# Patient Record
Sex: FEMALE | Race: WHITE | NOT HISPANIC OR LATINO | Employment: UNEMPLOYED | URBAN - METROPOLITAN AREA
[De-identification: names, ages, dates, MRNs, and addresses within clinical notes are randomized per-mention and may not be internally consistent; named-entity substitution may affect disease eponyms.]

---

## 2017-09-18 PROBLEM — R56.9 GENERALIZED CONVULSIVE SEIZURES: Status: ACTIVE | Noted: 2017-09-18

## 2017-09-18 PROBLEM — G43.909 HEADACHE, MIGRAINE: Status: ACTIVE | Noted: 2017-09-18

## 2017-09-18 PROBLEM — Z72.0 TOBACCO ABUSE: Status: ACTIVE | Noted: 2017-09-18

## 2017-09-18 PROBLEM — G47.00 INSOMNIA: Status: ACTIVE | Noted: 2017-09-18

## 2017-11-28 ENCOUNTER — OFFICE VISIT (OUTPATIENT)
Dept: NEUROLOGY | Facility: CLINIC | Age: 52
End: 2017-11-28

## 2017-11-28 VITALS — HEIGHT: 67 IN | SYSTOLIC BLOOD PRESSURE: 104 MMHG | DIASTOLIC BLOOD PRESSURE: 73 MMHG | HEART RATE: 98 BPM

## 2017-11-28 DIAGNOSIS — G43.009 MIGRAINE WITHOUT AURA AND WITHOUT STATUS MIGRAINOSUS, NOT INTRACTABLE: Primary | ICD-10-CM

## 2017-11-28 DIAGNOSIS — R55 SYNCOPE AND COLLAPSE: ICD-10-CM

## 2017-11-28 DIAGNOSIS — R56.9 GENERALIZED CONVULSIVE SEIZURES (HCC): ICD-10-CM

## 2017-11-28 PROCEDURE — 99214 OFFICE O/P EST MOD 30 MIN: CPT | Performed by: PSYCHIATRY & NEUROLOGY

## 2017-11-28 RX ORDER — AMITRIPTYLINE HYDROCHLORIDE 10 MG/1
10 TABLET, FILM COATED ORAL NIGHTLY
Qty: 90 TABLET | Refills: 3 | Status: SHIPPED | OUTPATIENT
Start: 2017-11-28 | End: 2019-01-07 | Stop reason: SDUPTHER

## 2017-11-28 RX ORDER — MAGNESIUM CARB/ALUMINUM HYDROX 105-160MG
TABLET,CHEWABLE ORAL
COMMUNITY
Start: 2017-09-15 | End: 2019-01-07

## 2017-11-28 RX ORDER — POLYETHYLENE GLYCOL-3350 AND ELECTROLYTES 236; 6.74; 5.86; 2.97; 22.74 G/274.31G; G/274.31G; G/274.31G; G/274.31G; G/274.31G
POWDER, FOR SOLUTION ORAL
COMMUNITY
Start: 2017-09-08 | End: 2019-01-07

## 2017-11-28 RX ORDER — AMITRIPTYLINE HYDROCHLORIDE 10 MG/1
TABLET, FILM COATED ORAL
COMMUNITY
Start: 2017-11-14 | End: 2017-11-28 | Stop reason: SDUPTHER

## 2017-11-28 RX ORDER — LINACLOTIDE 290 UG/1
CAPSULE, GELATIN COATED ORAL
COMMUNITY
Start: 2017-11-21 | End: 2019-01-07

## 2017-11-28 RX ORDER — MIRABEGRON 50 MG/1
TABLET, FILM COATED, EXTENDED RELEASE ORAL
COMMUNITY
Start: 2017-11-19 | End: 2019-01-07

## 2017-11-28 NOTE — PROGRESS NOTES
Subjective:     Patient ID: Niesha Garcia is a 51 y.o. female.    CC:   Chief Complaint   Patient presents with   • Headache       HPI:   History of Present Illness  The following portions of the patient's history were reviewed and updated as appropriate: allergies, current medications, past family history, past medical history, past social history, past surgical history and problem list.     Patient reports that headaches are better, takes prn Exedrin. Reports a spell of dizziness causing a fall during a bowel prep for colonoscopy about 2 months ago. She has a history of spells, some with convulsions since age 10, told at [facility] that she does not have epilepsy. Spells are usually triggered by anxiety or fatigue. MRI brain and EEG were normal in 2015.    Past Medical History:   Diagnosis Date   • Migraine    • Numbness        History reviewed. No pertinent surgical history.    Social History     Social History   • Marital status:      Spouse name: N/A   • Number of children: N/A   • Years of education: N/A     Occupational History   • Not on file.     Social History Main Topics   • Smoking status: Smoker, Current Status Unknown   • Smokeless tobacco: Not on file   • Alcohol use No   • Drug use: Defer   • Sexual activity: Defer     Other Topics Concern   • Not on file     Social History Narrative       Family History   Problem Relation Age of Onset   • Alcohol abuse Other    • Cancer Other         Review of Systems   Constitutional: Negative for chills, fatigue, fever and unexpected weight change.   HENT: Negative for ear pain, hearing loss, nosebleeds, rhinorrhea and sore throat.    Eyes: Negative for photophobia, pain, discharge, itching and visual disturbance.   Respiratory: Negative for cough, chest tightness, shortness of breath and wheezing.    Cardiovascular: Negative for chest pain, palpitations and leg swelling.   Gastrointestinal: Negative for abdominal pain, blood in stool, constipation, diarrhea,  nausea and vomiting.   Genitourinary: Negative for dysuria, frequency, hematuria and urgency.   Musculoskeletal: Negative for arthralgias, back pain, gait problem, joint swelling, myalgias, neck pain and neck stiffness.   Skin: Negative for rash and wound.   Allergic/Immunologic: Negative for environmental allergies and food allergies.   Neurological: Negative for dizziness, tremors, seizures, syncope, speech difficulty, weakness, light-headedness, numbness and headaches.   Hematological: Negative for adenopathy. Does not bruise/bleed easily.   Psychiatric/Behavioral: Positive for dysphoric mood and sleep disturbance. Negative for agitation, confusion, decreased concentration, hallucinations and suicidal ideas. The patient is nervous/anxious.         Objective:    Neurologic Exam     Mental Status   Oriented to person, place, and time.       Physical Exam   Constitutional: She is oriented to person, place, and time. She appears well-developed and well-nourished.   Cardiovascular: Normal rate and regular rhythm.    Pulmonary/Chest: Effort normal.   Neurological: She is alert and oriented to person, place, and time. She has normal reflexes.   Psychiatric: She has a normal mood and affect. Her behavior is normal. Thought content normal.       Assessment/Plan:       Niesha was seen today for headache.    Diagnoses and all orders for this visit:    Migraine without aura and without status migrainosus, not intractable  -     amitriptyline (ELAVIL) 10 MG tablet; Take 1 tablet by mouth Every Night.    Syncope and collapse       -      Improve hydration, drink 6 glasses of water daily  Generalized convulsive seizures       -      We reviewed prior work up and history, no further tests needed.     Encouraged to call for new concerns.    Joey Goyal MD  11/28/2017

## 2018-12-22 DIAGNOSIS — G43.009 MIGRAINE WITHOUT AURA AND WITHOUT STATUS MIGRAINOSUS, NOT INTRACTABLE: ICD-10-CM

## 2018-12-27 RX ORDER — AMITRIPTYLINE HYDROCHLORIDE 10 MG/1
TABLET, FILM COATED ORAL
Qty: 30 TABLET | Refills: 2 | OUTPATIENT
Start: 2018-12-27

## 2019-01-07 ENCOUNTER — OFFICE VISIT (OUTPATIENT)
Dept: NEUROLOGY | Facility: CLINIC | Age: 54
End: 2019-01-07

## 2019-01-07 VITALS
DIASTOLIC BLOOD PRESSURE: 68 MMHG | BODY MASS INDEX: 17.11 KG/M2 | SYSTOLIC BLOOD PRESSURE: 112 MMHG | HEIGHT: 67 IN | WEIGHT: 109 LBS | HEART RATE: 73 BPM | OXYGEN SATURATION: 99 %

## 2019-01-07 DIAGNOSIS — H53.9 VISION CHANGES: Primary | ICD-10-CM

## 2019-01-07 DIAGNOSIS — G43.009 MIGRAINE WITHOUT AURA AND WITHOUT STATUS MIGRAINOSUS, NOT INTRACTABLE: ICD-10-CM

## 2019-01-07 PROCEDURE — 99214 OFFICE O/P EST MOD 30 MIN: CPT | Performed by: NURSE PRACTITIONER

## 2019-01-07 RX ORDER — AMITRIPTYLINE HYDROCHLORIDE 10 MG/1
10 TABLET, FILM COATED ORAL NIGHTLY
Qty: 90 TABLET | Refills: 3 | Status: SHIPPED | OUTPATIENT
Start: 2019-01-07 | End: 2019-12-19 | Stop reason: SDUPTHER

## 2019-01-07 NOTE — PROGRESS NOTES
"Subjective:     Patient ID: Niesha Garcia is a 53 y.o. female.    CC:   Chief Complaint   Patient presents with   • Migraine       HPI:   History of Present Illness   Ms Garcia is here for annual check on migraines and non epileptic seizures.  She tells me that she has been doing very well in regards to her migraine headaches on amitriptyline 10 mg, she ran out about a week ago and has had a headache since.  Typically while on her medicine regularly she has only one migraine every 2 months or so.  She also has a history of \"seizures\".,  She had apparent full workup at [facility] and was told her seizures were nonepileptic according to previous notes.  She tells me she still has episodes of her \"hands drawing\" which is what her seizures have always been like since the age of 10.  She does report these are much less intense and less often since adding the amitriptyline.  She has been struggling with some vision changes, last eye exam was a couple years ago when she told she had cataracts and other aging vision changes.  She has not had an eye exam since.  She reports her eyes are just more strained overall.  In her migraines are classic with sensitivity to light and sound and associated nausea.  Again these are much improved with amitriptyline.    The following portions of the patient's history were reviewed and updated as appropriate: allergies, current medications, past family history, past medical history, past social history, past surgical history and problem list.    Past Medical History:   Diagnosis Date   • Migraine    • Numbness        No past surgical history on file.    Social History     Socioeconomic History   • Marital status:      Spouse name: Not on file   • Number of children: Not on file   • Years of education: Not on file   • Highest education level: Not on file   Social Needs   • Financial resource strain: Not on file   • Food insecurity - worry: Not on file   • Food insecurity - " inability: Not on file   • Transportation needs - medical: Not on file   • Transportation needs - non-medical: Not on file   Occupational History   • Not on file   Tobacco Use   • Smoking status: Smoker, Current Status Unknown   Substance and Sexual Activity   • Alcohol use: No   • Drug use: Defer   • Sexual activity: Defer   Other Topics Concern   • Not on file   Social History Narrative   • Not on file       Family History   Problem Relation Age of Onset   • Alcohol abuse Other    • Cancer Other         Review of Systems   Constitutional: Negative.    Eyes: Positive for visual disturbance.   Gastrointestinal: Positive for nausea (w/migraines only).   Endocrine: Negative.    Genitourinary: Negative.    Musculoskeletal: Negative.    Skin: Negative.    Allergic/Immunologic: Negative.    Neurological: Positive for seizures and headaches. Negative for dizziness, tremors, syncope, facial asymmetry, speech difficulty, weakness, light-headedness and numbness.   Hematological: Negative.    Psychiatric/Behavioral: The patient is nervous/anxious.         Objective:    Neurologic Exam     Mental Status   Oriented to person, place, and time.   Attention: normal. Concentration: normal.   Speech: speech is normal   Level of consciousness: alert  Knowledge: consistent with education.   Able to read. Able to write. Normal comprehension.     Cranial Nerves   Cranial nerves II through XII intact.     CN II   Right visual field deficit: none  Left visual field deficit: none     CN III, IV, VI   Pupils are equal, round, and reactive to light.  Extraocular motions are normal.   Nystagmus: none   Upgaze: normal  Downgaze: normal    CN V   Facial sensation intact.     CN VII   Facial expression full, symmetric.     CN IX, X   CN IX normal.   CN X normal.     CN XI   CN XI normal.     CN XII   CN XII normal.     Motor Exam   Muscle bulk: normal  Overall muscle tone: normal    Strength   Strength 5/5 throughout.     Gait, Coordination, and  Reflexes     Gait  Gait: normal    Coordination   Romberg: negative  Finger to nose coordination: normal    Tremor   Resting tremor: absent  Intention tremor: absent  Action tremor: absent    Reflexes   Reflexes 2+ except as noted.   Right Alfaro: absent  Left Alfaro: absent      Physical Exam   Constitutional: She is oriented to person, place, and time. She appears well-developed and well-nourished. No distress.   HENT:   Head: Normocephalic and atraumatic.   Eyes: EOM are normal. Pupils are equal, round, and reactive to light.   Neck: Normal range of motion.   Cardiovascular: Normal rate.   Pulmonary/Chest: Effort normal. No respiratory distress.   Neurological: She is alert and oriented to person, place, and time. She has normal strength. No cranial nerve deficit. She has a normal Finger-Nose-Finger Test and a normal Romberg Test. Gait normal.   Skin: Skin is warm. Capillary refill takes less than 2 seconds.   Psychiatric: She has a normal mood and affect. Her speech is normal and behavior is normal. Judgment and thought content normal.   Vitals reviewed.      Assessment/Plan:       Niesha was seen today for migraine.    Diagnoses and all orders for this visit:    Vision changes  -     MRI Brain Without Contrast    Migraine without aura and without status migrainosus, not intractable  -     amitriptyline (ELAVIL) 10 MG tablet; Take 1 tablet by mouth Every Night.  -     MRI Brain Without Contrast    Mrs. Garcia reports her headaches are improved with the amitriptyline however she has noticed some vision changes in the past year.  She still having what she calls seizures which are described as her hands cramping, she has no mental status changes or other neurological complaints during these episodes.  She does have prior diagnosis of nonepileptic seizures, worse with stress.  She's tolerating the amitriptyline 10 mg with no problems, this is worked well for headache control.  She may increase to 20 mg if  needed as she reports that her seizures have greatly improved with the addition of this medicine.  Due to her vision changes I would like to get an MRI of her brain to rule out tumor or lesion  Follow-up in about 6-8 weeks or sooner if needed and, she will call with any problems or concerns prior to follow-up appointment  Reviewed medications, potential side effects and signs and symptoms to report. Discussed risk versus benefits of treatment plan with patient and/or family-including medications, labs and radiology that may be ordered. Addressed questions and concerns during visit. Patient and/or family verbalized understanding and agree with plan.  EMR Dragon/Transcription Disclaimer:  Much of this encounter note is an electronic transcription of spoken language to printed text. Electronic transcription of spoken language may permit erroneous words or phrases to be inadvertently transcribed. Although I have reviewed the note for such errors, some may still exist in this documentation.           Natali Leal, APRN  1/7/2019

## 2019-01-22 ENCOUNTER — HOSPITAL ENCOUNTER (OUTPATIENT)
Dept: MRI IMAGING | Facility: HOSPITAL | Age: 54
Discharge: HOME | End: 2019-01-22
Attending: NURSE PRACTITIONER | Admitting: NURSE PRACTITIONER

## 2019-01-22 PROCEDURE — 70551 MRI BRAIN STEM W/O DYE: CPT

## 2019-02-06 ENCOUNTER — TELEPHONE (OUTPATIENT)
Dept: NEUROLOGY | Facility: CLINIC | Age: 54
End: 2019-02-06

## 2019-02-06 NOTE — TELEPHONE ENCOUNTER
----- Message from CASSIDY Carroll sent at 2/6/2019  8:43 AM EST -----  Please let her know her MRI was read a unremarkable with exception of mild sinus inflammation. Keep follow up appt

## 2019-03-01 ENCOUNTER — OFFICE VISIT (OUTPATIENT)
Dept: NEUROLOGY | Facility: CLINIC | Age: 54
End: 2019-03-01

## 2019-03-01 VITALS
HEART RATE: 84 BPM | BODY MASS INDEX: 17.11 KG/M2 | WEIGHT: 109 LBS | DIASTOLIC BLOOD PRESSURE: 62 MMHG | OXYGEN SATURATION: 99 % | SYSTOLIC BLOOD PRESSURE: 98 MMHG | HEIGHT: 67 IN

## 2019-03-01 DIAGNOSIS — G43.009 MIGRAINE WITHOUT AURA AND WITHOUT STATUS MIGRAINOSUS, NOT INTRACTABLE: Primary | ICD-10-CM

## 2019-03-01 DIAGNOSIS — R56.9 GENERALIZED CONVULSIVE SEIZURES (HCC): ICD-10-CM

## 2019-03-01 PROCEDURE — 99213 OFFICE O/P EST LOW 20 MIN: CPT | Performed by: NURSE PRACTITIONER

## 2019-06-28 ENCOUNTER — OFFICE VISIT (OUTPATIENT)
Dept: NEUROLOGY | Facility: CLINIC | Age: 54
End: 2019-06-28

## 2019-06-28 VITALS
BODY MASS INDEX: 16.79 KG/M2 | OXYGEN SATURATION: 98 % | HEIGHT: 67 IN | HEART RATE: 102 BPM | WEIGHT: 107 LBS | SYSTOLIC BLOOD PRESSURE: 130 MMHG | DIASTOLIC BLOOD PRESSURE: 82 MMHG

## 2019-06-28 DIAGNOSIS — R56.9 SEIZURE-LIKE ACTIVITY (HCC): Primary | ICD-10-CM

## 2019-06-28 DIAGNOSIS — G43.009 MIGRAINE WITHOUT AURA AND WITHOUT STATUS MIGRAINOSUS, NOT INTRACTABLE: ICD-10-CM

## 2019-06-28 PROCEDURE — 99214 OFFICE O/P EST MOD 30 MIN: CPT | Performed by: NURSE PRACTITIONER

## 2019-08-09 ENCOUNTER — HOSPITAL ENCOUNTER (OUTPATIENT)
Dept: NEUROLOGY | Facility: HOSPITAL | Age: 54
Discharge: HOME | End: 2019-08-09
Admitting: NURSE PRACTITIONER

## 2019-08-09 PROCEDURE — 95816 EEG AWAKE AND DROWSY: CPT

## 2019-12-19 ENCOUNTER — OFFICE VISIT (OUTPATIENT)
Dept: NEUROLOGY | Facility: CLINIC | Age: 54
End: 2019-12-19

## 2019-12-19 VITALS
BODY MASS INDEX: 17.74 KG/M2 | OXYGEN SATURATION: 97 % | SYSTOLIC BLOOD PRESSURE: 100 MMHG | DIASTOLIC BLOOD PRESSURE: 70 MMHG | HEART RATE: 93 BPM | HEIGHT: 67 IN | WEIGHT: 113 LBS

## 2019-12-19 DIAGNOSIS — G43.009 MIGRAINE WITHOUT AURA AND WITHOUT STATUS MIGRAINOSUS, NOT INTRACTABLE: Primary | ICD-10-CM

## 2019-12-19 DIAGNOSIS — F41.9 ANXIETY: ICD-10-CM

## 2019-12-19 PROCEDURE — 99213 OFFICE O/P EST LOW 20 MIN: CPT | Performed by: NURSE PRACTITIONER

## 2019-12-19 RX ORDER — AMITRIPTYLINE HYDROCHLORIDE 10 MG/1
TABLET, FILM COATED ORAL
Qty: 90 TABLET | Refills: 3 | Status: SHIPPED | OUTPATIENT
Start: 2019-12-19 | End: 2020-06-17 | Stop reason: SDUPTHER

## 2019-12-19 NOTE — PROGRESS NOTES
"Subjective:     Patient ID: Niesha  Jose is a 54 y.o. female.    CC:   Chief Complaint   Patient presents with   • Migraine       HPI:   History of Present Illness     Ms Garcia is here for follow up   When I last saw her she was here for annual check on migraines and non epileptic seizures.  She has actually been doing very well with her migraines  on amitriptyline 10 mg.  She has only had 2 migraines in the past few months.    She also has hx of psychogenic seizures,   She had apparent full workup at [facility] and was told her seizures were nonepileptic according to previous notes.  She tells me she still has episodes of her \"hands drawing\" which is what her seizures have always been like since the age of 10.  She does report these are much less intense and less often since adding the amitriptyline.  She does tell me today that she has had a significant increase in her anxiety.  She has only been taking 10 mg of the amitriptyline, previously I did instruct her she may increase to 20.  She is going through a lot of stress right now.  She is not currently in psychiatric care.  RECENT TESTS INCLUDE  MRI brain read as normal    The following portions of the patient's history were reviewed and updated as appropriate: allergies, current medications, past family history, past medical history, past social history, past surgical history and problem list.    Past Medical History:   Diagnosis Date   • Migraine    • Numbness        History reviewed. No pertinent surgical history.    Social History     Socioeconomic History   • Marital status:      Spouse name: Not on file   • Number of children: Not on file   • Years of education: Not on file   • Highest education level: Not on file   Tobacco Use   • Smoking status: Current Every Day Smoker   Substance and Sexual Activity   • Alcohol use: No   • Drug use: Defer   • Sexual activity: Defer       Family History   Problem Relation Age of Onset   • " "Alcohol abuse Other    • Cancer Other         Review of Systems   Constitutional: Negative.    HENT: Negative.    Eyes: Negative.    Respiratory: Negative.    Cardiovascular: Negative.    Gastrointestinal: Negative.    Endocrine: Negative.    Genitourinary: Negative.    Musculoskeletal: Negative.    Skin: Negative.    Allergic/Immunologic: Negative.    Neurological: Negative.    Hematological: Negative.    Psychiatric/Behavioral: The patient is nervous/anxious.         Objective:  /70   Pulse 93   Ht 170.2 cm (67\")   Wt 51.3 kg (113 lb)   SpO2 97%   BMI 17.70 kg/m²     Neurologic Exam     Mental Status   Oriented to person, place, and time.   Attention: normal. Concentration: normal.   Speech: speech is normal   Level of consciousness: alert  Knowledge: consistent with education.   Able to read. Able to write. Normal comprehension.     Cranial Nerves   Cranial nerves II through XII intact.     CN II   Visual fields full to confrontation.   Right visual field deficit: none  Left visual field deficit: none     CN III, IV, VI   Pupils are equal, round, and reactive to light.  Extraocular motions are normal.   Right pupil: Shape: regular. Reactivity: brisk. Consensual response: intact. Accommodation: intact.   Left pupil: Shape: regular. Reactivity: brisk. Consensual response: intact. Accommodation: intact.   CN III: no CN III palsy  CN VI: no CN VI palsy  Nystagmus: none   Upgaze: normal  Downgaze: normal    CN V   Facial sensation intact.     CN VII   Facial expression full, symmetric.     CN VIII   CN VIII normal.     CN IX, X   CN IX normal.   CN X normal.     CN XI   CN XI normal.     CN XII   CN XII normal.     Motor Exam   Muscle bulk: normal  Overall muscle tone: normal  Right arm tone: normal  Left arm tone: normal  Right arm pronator drift: absent  Left arm pronator drift: absent  Right leg tone: normal  Left leg tone: normal    Strength   Strength 5/5 throughout.     Sensory Exam   Light touch " normal.     Gait, Coordination, and Reflexes     Gait  Gait: normal    Coordination   Finger to nose coordination: normal    Tremor   Resting tremor: absent  Intention tremor: absent  Action tremor: absent    Reflexes   Reflexes 2+ except as noted.   Right plantar: normal  Left plantar: normal  Right Alfaro: absent  Left Alfaro: absent      Physical Exam   Constitutional: She is oriented to person, place, and time. She appears well-developed and well-nourished.   HENT:   Head: Normocephalic and atraumatic.   Eyes: Pupils are equal, round, and reactive to light. Conjunctivae and EOM are normal. No scleral icterus.   Neck: Normal range of motion. Neck supple.   Pulmonary/Chest: Effort normal. No respiratory distress.   Neurological: She is alert and oriented to person, place, and time. She has normal strength. She has a normal Finger-Nose-Finger Test. Gait normal.   Skin: Skin is warm.   Psychiatric: She has a normal mood and affect. Her speech is normal and behavior is normal. Judgment and thought content normal.   Vitals reviewed.      Assessment/Plan:       Niesha was seen today for migraine.    Diagnoses and all orders for this visit:    Migraine without aura and without status migrainosus, not intractable  -     amitriptyline (ELAVIL) 10 MG tablet; Take 1-2 PO qhs    Anxiety  -     Thyroid Panel With TSH    Tonie is doing very well in regards to her migraine treatment.  She is only had 1 or 2 migraines in the past several months.  Unfortunately she is going through a lot of stress right now and her anxiety levels up.  For now we are going to go ahead and increase her amitriptyline to 20 mg at night, I do recommend that she establish care with a counselor.  I have put in an order for a TSH check due to her anxiety as well.  I will contact her with labs when they are resulted.  At this point I will see her back in about 6 months for her migraines, if she has any questions concerns or problems prior to her  follow-up appointment I encouraged her to notify my office.  Reviewed medications, potential side effects and signs and symptoms to report. Discussed risk versus benefits of treatment plan with patient and/or family-including medications, labs and radiology that may be ordered. Addressed questions and concerns during visit. Patient and/or family verbalized understanding and agree with plan.  We reviewed possible headache triggers, stress relief techniques, and alternative treatments for headaches. The patient was in understanding and all questions were addressed. We reviewed the diagnosis and treatment plan and medication side effect profile. The patient will follow up as scheduled.  EMR Dragon/Transcription Disclaimer:  Much of this encounter note is an electronic transcription of spoken language to printed text. Electronic transcription of spoken language may permit erroneous words or phrases to be inadvertently transcribed. Although I have reviewed the note for such errors, some may still exist in this documentation.           Natali Leal, APRN  12/19/2019

## 2020-01-14 DIAGNOSIS — G43.009 MIGRAINE WITHOUT AURA AND WITHOUT STATUS MIGRAINOSUS, NOT INTRACTABLE: ICD-10-CM

## 2020-01-15 RX ORDER — AMITRIPTYLINE HYDROCHLORIDE 10 MG/1
TABLET, FILM COATED ORAL
Qty: 30 TABLET | Refills: 2 | OUTPATIENT
Start: 2020-01-15

## 2020-06-17 ENCOUNTER — OFFICE VISIT (OUTPATIENT)
Dept: NEUROLOGY | Facility: CLINIC | Age: 55
End: 2020-06-17

## 2020-06-17 VITALS
HEART RATE: 79 BPM | SYSTOLIC BLOOD PRESSURE: 120 MMHG | DIASTOLIC BLOOD PRESSURE: 60 MMHG | BODY MASS INDEX: 17.89 KG/M2 | TEMPERATURE: 97.8 F | OXYGEN SATURATION: 98 % | WEIGHT: 114 LBS | HEIGHT: 67 IN

## 2020-06-17 DIAGNOSIS — G43.009 MIGRAINE WITHOUT AURA AND WITHOUT STATUS MIGRAINOSUS, NOT INTRACTABLE: ICD-10-CM

## 2020-06-17 PROCEDURE — 99212 OFFICE O/P EST SF 10 MIN: CPT | Performed by: NURSE PRACTITIONER

## 2020-06-17 RX ORDER — AMITRIPTYLINE HYDROCHLORIDE 10 MG/1
TABLET, FILM COATED ORAL
Qty: 90 TABLET | Refills: 3 | Status: SHIPPED | OUTPATIENT
Start: 2020-06-17 | End: 2021-09-29 | Stop reason: SDUPTHER

## 2020-06-17 NOTE — PROGRESS NOTES
Subjective:     Patient ID: Niesha  Jose is a 54 y.o. female.    CC:   Chief Complaint   Patient presents with   • Migraine       HPI:   History of Present Illness     Ms Jose is here for follow up   When I last saw her she was here for annual check on migraines and non epileptic seizures.  She has actually been doing very well with her migraines  on amitriptyline 10 mg, at last visit she was stating that she was a bit anxious so I advised her to increase her amitriptyline to 2 at night.  She says she took increased dose a couple nights and felt like it made her feel odd and she had a seizure-like episode.  She went back to 10 mg and she has been doing great since.  She reports at this time she is having rare mild headaches, when she does have a migraine they respond very well to 1-2 ibuprofen.   She has no new complaints today  The following portions of the patient's history were reviewed and updated as appropriate: allergies, current medications, past family history, past medical history, past social history, past surgical history and problem list.    Past Medical History:   Diagnosis Date   • Migraine    • Numbness        History reviewed. No pertinent surgical history.    Social History     Socioeconomic History   • Marital status:      Spouse name: Not on file   • Number of children: Not on file   • Years of education: Not on file   • Highest education level: Not on file   Tobacco Use   • Smoking status: Current Every Day Smoker   Substance and Sexual Activity   • Alcohol use: No   • Drug use: Defer   • Sexual activity: Defer       Family History   Problem Relation Age of Onset   • Alcohol abuse Other    • Cancer Other    • Migraines Mother         Review of Systems   Constitutional: Negative.    Eyes: Negative.    Respiratory: Negative.    Cardiovascular: Negative.    Gastrointestinal: Negative.    Endocrine: Negative.    Genitourinary: Negative.    Musculoskeletal: Negative.    Skin:  "Negative.    Allergic/Immunologic: Negative.    Neurological: Positive for numbness and headaches. Negative for dizziness, tremors, seizures, syncope, facial asymmetry, speech difficulty, weakness and light-headedness.   Hematological: Negative.    Psychiatric/Behavioral: Negative.         Objective:  /60   Pulse 79   Temp 97.8 °F (36.6 °C)   Ht 170.2 cm (67\")   Wt 51.7 kg (114 lb)   SpO2 98%   BMI 17.85 kg/m²     Neurologic Exam     Mental Status   Oriented to person, place, and time.   Attention: normal. Concentration: normal.   Speech: speech is normal   Level of consciousness: alert  Knowledge: consistent with education.   Able to read. Able to write. Normal comprehension.     Cranial Nerves   Cranial nerves II through XII intact.     CN II   Visual fields full to confrontation.   Right visual field deficit: none  Left visual field deficit: none     CN III, IV, VI   Pupils are equal, round, and reactive to light.  Extraocular motions are normal.   Right pupil: Shape: regular. Reactivity: brisk. Consensual response: intact. Accommodation: intact.   Left pupil: Shape: regular. Reactivity: brisk. Consensual response: intact. Accommodation: intact.   CN III: no CN III palsy  CN VI: no CN VI palsy  Nystagmus: none   Upgaze: normal  Downgaze: normal    CN V   Facial sensation intact.     CN VII   Facial expression full, symmetric.     CN VIII   CN VIII normal.     CN IX, X   CN IX normal.   CN X normal.     CN XI   CN XI normal.     CN XII   CN XII normal.     Motor Exam   Muscle bulk: normal  Overall muscle tone: normal  Right arm tone: normal  Left arm tone: normal  Right arm pronator drift: absent  Left arm pronator drift: absent  Right leg tone: normal  Left leg tone: normal    Strength   Strength 5/5 throughout.     Sensory Exam   Light touch normal.     Gait, Coordination, and Reflexes     Gait  Gait: normal    Coordination   Finger to nose coordination: normal    Tremor   Resting tremor: " absent  Intention tremor: absent  Action tremor: absent    Reflexes   Reflexes 2+ except as noted.   Right Alfaro: absent  Left Alfaro: absent      Physical Exam   Constitutional: She is oriented to person, place, and time. She appears well-developed and well-nourished. No distress.   HENT:   Head: Normocephalic and atraumatic.   Eyes: Pupils are equal, round, and reactive to light. Conjunctivae and EOM are normal. No scleral icterus.   Neck: Normal range of motion. Neck supple.   Pulmonary/Chest: Effort normal. No respiratory distress.   Neurological: She is alert and oriented to person, place, and time. She has normal strength. She has a normal Finger-Nose-Finger Test. Gait normal.   Skin: Skin is warm.   Psychiatric: She has a normal mood and affect. Her speech is normal and behavior is normal. Judgment and thought content normal.   Vitals reviewed.      Assessment/Plan:       Niesha was seen today for migraine.    Diagnoses and all orders for this visit:    Migraine without aura and without status migrainosus, not intractable  -     amitriptyline (ELAVIL) 10 MG tablet; Take 1-2 PO qhs    Tonie is doing well, she has no new complaints today.  She states her migraines are under very good control with 10 mg of amitriptyline.  She had a seizure-like episode after increasing amitriptyline about 6 months ago but none since when she went back to the 10 mg.  She is feeling well at this time, she would like to follow back here annually       She is instructed to notify my office with any questions concerns or problems prior to her follow-up appointment    Reviewed medications, potential side effects and signs and symptoms to report. Discussed risk versus benefits of treatment plan with patient and/or family-including medications, labs and radiology that may be ordered. Addressed questions and concerns during visit. Patient and/or family verbalized understanding and agree with plan.        Natali Leal,  APRN  6/17/2020

## 2021-09-29 DIAGNOSIS — G43.009 MIGRAINE WITHOUT AURA AND WITHOUT STATUS MIGRAINOSUS, NOT INTRACTABLE: ICD-10-CM

## 2021-10-01 RX ORDER — AMITRIPTYLINE HYDROCHLORIDE 10 MG/1
TABLET, FILM COATED ORAL
Qty: 90 TABLET | Refills: 2 | Status: SHIPPED | OUTPATIENT
Start: 2021-10-01 | End: 2022-01-26 | Stop reason: SDUPTHER

## 2022-01-26 ENCOUNTER — TELEPHONE (OUTPATIENT)
Dept: NEUROLOGY | Facility: CLINIC | Age: 57
End: 2022-01-26

## 2022-01-26 DIAGNOSIS — G43.009 MIGRAINE WITHOUT AURA AND WITHOUT STATUS MIGRAINOSUS, NOT INTRACTABLE: ICD-10-CM

## 2022-01-26 RX ORDER — AMITRIPTYLINE HYDROCHLORIDE 10 MG/1
TABLET, FILM COATED ORAL
Qty: 90 TABLET | Refills: 2 | Status: SHIPPED | OUTPATIENT
Start: 2022-01-26 | End: 2022-02-23 | Stop reason: SDUPTHER

## 2022-01-26 NOTE — TELEPHONE ENCOUNTER
----- Message from Roxana William sent at 1/26/2022  8:55 AM EST -----  PATIENT RESCHEDULED HER APPT DUE TO COVID SYMPTOMS, AND WILL NEED A REFILL ON HER amitriptyline.  I CONFIRMED HER PHARMACY, AND I RESCHEDULED HER APPT FOR FEBURARY 23.

## 2022-02-23 ENCOUNTER — OFFICE VISIT (OUTPATIENT)
Dept: NEUROLOGY | Facility: CLINIC | Age: 57
End: 2022-02-23

## 2022-02-23 VITALS
DIASTOLIC BLOOD PRESSURE: 80 MMHG | OXYGEN SATURATION: 94 % | HEIGHT: 66 IN | BODY MASS INDEX: 24.11 KG/M2 | SYSTOLIC BLOOD PRESSURE: 100 MMHG | WEIGHT: 150 LBS | TEMPERATURE: 96.7 F | HEART RATE: 91 BPM

## 2022-02-23 DIAGNOSIS — Z84.89 FAMILY HISTORY OF GENETIC DISORDER: ICD-10-CM

## 2022-02-23 DIAGNOSIS — G43.009 MIGRAINE WITHOUT AURA AND WITHOUT STATUS MIGRAINOSUS, NOT INTRACTABLE: Primary | ICD-10-CM

## 2022-02-23 PROCEDURE — 99213 OFFICE O/P EST LOW 20 MIN: CPT | Performed by: NURSE PRACTITIONER

## 2022-02-23 RX ORDER — AMITRIPTYLINE HYDROCHLORIDE 10 MG/1
TABLET, FILM COATED ORAL
Qty: 90 TABLET | Refills: 1 | Status: SHIPPED | OUTPATIENT
Start: 2022-02-23 | End: 2023-01-17 | Stop reason: SDUPTHER

## 2022-02-23 NOTE — PROGRESS NOTES
"Subjective:     Patient ID: Niesha  Jose is a 56 y.o. female.    CC:   Chief Complaint   Patient presents with   • Migraine       HPI:   History of Present Illness     Ms Garcia is here for follow up, she was last seen in June 2020.  She is a long term pt of Dr Goyal followed for migraines and non epileptic seizures  Couple years ago I started her on amitriptyline for her migraines and she tells me today that this is worked wonders for her.  She rarely has \"fits\", she may have 1 migraine a month, she can go a month or so without having any migraines.  She is only taking 10 mg and this dosage works very well for her.  She does not use anything for acute headache treatment and is not interested in a new prescription today  She denies any seizures or loss of consciousness.  She denies any new symptoms at all.  She has decreased a lot of the stress in her life and feels like this is helped her overall in many ways    She is asking about genetics testing, she has a strong family history on both sides of colon cancer, breast cancer and ovarian cancer.  2 of her daughters were also diagnosed with some type of genetic disorder, she is unsure exactly what but she would like testing performed    The following portions of the patient's history were reviewed and updated as appropriate: allergies, current medications, past family history, past medical history, past social history, past surgical history and problem list.    Past Medical History:   Diagnosis Date   • Migraine    • Numbness        No past surgical history on file.    Social History     Socioeconomic History   • Marital status:    Tobacco Use   • Smoking status: Current Every Day Smoker   • Smokeless tobacco: Never Used   Vaping Use   • Vaping Use: Never used   Substance and Sexual Activity   • Alcohol use: No   • Drug use: Defer   • Sexual activity: Defer       Family History   Problem Relation Age of Onset   • Alcohol abuse Other    • Cancer " "Other    • Migraines Mother         Review of Systems   Constitutional: Negative.    Eyes: Negative.    Respiratory: Negative.    Cardiovascular: Negative.    Gastrointestinal: Negative.    Neurological: Positive for headaches. Negative for dizziness, tremors, seizures, syncope, facial asymmetry, speech difficulty, weakness, light-headedness and numbness.        Objective:  /80   Pulse 91   Temp 96.7 °F (35.9 °C)   Ht 167.6 cm (66\")   Wt 68 kg (150 lb)   SpO2 94%   BMI 24.21 kg/m²     Neurologic Exam     Mental Status   Oriented to person, place, and time.   Attention: normal. Concentration: normal.   Speech: speech is normal   Level of consciousness: alert    Cranial Nerves   Cranial nerves II through XII intact.     CN III, IV, VI   Pupils are equal, round, and reactive to light.    Motor Exam   Muscle bulk: normal    Strength   Strength 5/5 throughout.     Gait, Coordination, and Reflexes     Gait  Gait: normal    Coordination   Finger to nose coordination: normal    Tremor   Resting tremor: absent  Intention tremor: absent  Action tremor: absent    Reflexes   Reflexes 2+ except as noted.       Physical Exam  Vitals reviewed.   Constitutional:       General: She is not in acute distress.     Appearance: She is well-developed. She is not ill-appearing or toxic-appearing.   HENT:      Head: Normocephalic and atraumatic.   Eyes:      General: No scleral icterus.     Extraocular Movements: Extraocular movements intact.      Conjunctiva/sclera: Conjunctivae normal.      Pupils: Pupils are equal, round, and reactive to light.   Pulmonary:      Effort: Pulmonary effort is normal. No respiratory distress.   Musculoskeletal:      Cervical back: Normal range of motion and neck supple.   Skin:     General: Skin is warm.      Capillary Refill: Capillary refill takes less than 2 seconds.   Neurological:      General: No focal deficit present.      Mental Status: She is alert and oriented to person, place, and " "time.      Coordination: Finger-Nose-Finger Test normal.      Gait: Gait is intact.      Deep Tendon Reflexes: Strength normal.   Psychiatric:         Mood and Affect: Mood normal.         Speech: Speech normal.         Behavior: Behavior normal.         Thought Content: Thought content normal.         Judgment: Judgment normal.         Assessment/Plan:       Diagnoses and all orders for this visit:    1. Migraine without aura and without status migrainosus, not intractable (Primary)  -     amitriptyline (ELAVIL) 10 MG tablet; Take 1 PO qhs  Dispense: 90 tablet; Refill: 1    2. Family history of genetic disorder  -     Ambulatory Referral to [facility]    Ms. Garcia is doing very well on low-dose amitriptyline, she has occasional migraines but they are much improved, she is not really having her \"fits\" anymore  She has decreased stress in her life and feels better overall.  She will continue amitriptyline, declines prescription for acute headache medication  Encourage hydration, adequate sleep, limiting NSAID and caffeine  She will continue current dose of amitriptyline  Referral to genetics per her request  Return to clinic 6 to 8 months or sooner if needed  She is encouraged to reach out with any questions concerns or problems prior to follow-up appointment         Reviewed medications, potential side effects and signs and symptoms to report. Discussed risk versus benefits of treatment plan with patient and/or family-including medications, labs and radiology that may be ordered. Addressed questions and concerns during visit. Patient and/or family verbalized understanding and agree with plan.    AS THE PROVIDER, I PERSONALLY WORE PPE DURING ENTIRE FACE TO FACE ENCOUNTER IN CLINIC WITH THE PATIENT. PATIENT ALSO WORE PPE DURING ENTIRE FACE TO FACE ENCOUNTER EXCEPT FOR A MAX OF 30 SECONDS DURING NEUROLOGICAL EVALUATION OF CRANIAL NERVES AND THEN MASK WAS PLACED BACK OVER PATIENT FACE FOR REMAINDER OF VISIT. " I WASHED MY HANDS BEFORE AND AFTER VISIT.    During this visit the following were done:  Labs Reviewed []    Labs Ordered []    Radiology Reports Reviewed []    Radiology Ordered []    PCP Records Reviewed []    Referring Provider Records Reviewed []    ER Records Reviewed []    Hospital Records Reviewed []    History Obtained From Family []    Radiology Images Reviewed []    Other Reviewed []    Records Requested []      Natali Leal, APRN  2/23/2022

## 2022-12-02 ENCOUNTER — TELEPHONE (OUTPATIENT)
Dept: GENETICS | Facility: HOSPITAL | Age: 57
End: 2022-12-02

## 2022-12-02 NOTE — TELEPHONE ENCOUNTER
Called patient to remind them of genetic counseling appt on Tuesday. Left message asking patient to call me back to review family history prior to appt.

## 2022-12-06 ENCOUNTER — LAB (OUTPATIENT)
Dept: LAB | Facility: HOSPITAL | Age: 57
End: 2022-12-06

## 2022-12-06 ENCOUNTER — CLINICAL SUPPORT (OUTPATIENT)
Dept: GENETICS | Facility: HOSPITAL | Age: 57
End: 2022-12-06

## 2022-12-06 DIAGNOSIS — Z80.3 FAMILY HISTORY OF MALIGNANT NEOPLASM OF BREAST: ICD-10-CM

## 2022-12-06 DIAGNOSIS — Z80.41 FAMILY HISTORY OF MALIGNANT NEOPLASM OF OVARY: ICD-10-CM

## 2022-12-06 DIAGNOSIS — Z13.79 GENETIC TESTING: Primary | ICD-10-CM

## 2022-12-06 DIAGNOSIS — Z80.0 FAMILY HISTORY OF MALIGNANT NEOPLASM OF GASTROINTESTINAL TRACT: ICD-10-CM

## 2022-12-06 PROCEDURE — 96040: CPT | Performed by: GENETIC COUNSELOR, MS

## 2022-12-06 NOTE — PROGRESS NOTES
Niesha Garcia, a 57-year old female, was referred for genetic counseling due to a family history of cancer. Ms. Garcia reports that her daughter recently tested positive for one mutation in the MUTYH gene.  Ms. Garcia was 17 years of age at menarche and her first live birth was at 23. She retains her uterus and ovaries. Her most recent mammogram was in 2018. She had her first colonoscopy in 2020 and reports that 3-4 polyps were removed. She was interested in discussing her risks for a hereditary cancer syndrome. Ms. Garcia was interested in pursuing genetic testing, and therefore, the [facility] panel through [employer] was ordered which analyzes MUTYH and 35 additional genes associated with an increased cancer risk. Results from this testing are expected in approximately 2-3 weeks.    FAMILY HISTORY: (See attached pedigree)   Mother:  Ovarian cancer, 30s; several colon polyps (at least 10-20 polyps over lifetime)  Mat. Uncle:  Colon cancer, d. 43  Father:   Mouth cancer, d. 67  Pat. Aunt:  Breast cancer, 40s  Pat. Aunt:  Breast cancer, 40s  Daughter:  MUTYH carrier (one mutation in MUTYH gene- copy of report not provided)    Medical records regarding the family history were not available for review.     RISK ASSESSMENT:  Ms. Garcia’s family history led to concern regarding a hereditary cancer syndrome.  She clearly meets NCCN guidelines criteria for BRCA1/2 testing given her maternal family history of ovarian cancer and paternal family history of breast cancer diagnosed before age 50. Additionally, Ms. Garcia’s daughter is reported to have an MUTYH mutation, therefore testing of this gene is clinically indicated for Ms. Garcia. Ms. Garcia opted to pursue testing via a multigene panel.     GENETIC COUNSELING: (30 minutes) We reviewed the family history information in detail. Cases of cancer follow three general patterns: sporadic, familial, and hereditary.  While most cancer is sporadic, some cases  appear to occur in family clusters. These cases are said to be familial and account for 10-20% of cancer cases.  Familial cases may be due to a combination of shared genes and environmental factors among family members. In even fewer families (5-10%), the risk for cancer is inherited, and the genes responsible for the increased cancer risk are known.     Family histories typical of hereditary cancer syndromes usually include multiple first- and second-degree relatives diagnosed with cancer types that define a syndrome. These cases tend to be diagnosed at younger-than-expected ages and can be bilateral or multifocal.  The cancer in these families follows an autosomal dominant inheritance pattern, which indicates the likely presence of a mutation in a cancer susceptibility gene.  Children and siblings of an individual believed to carry this mutation have a 50% chance of inheriting that mutation, thereby inheriting the increased risk to develop cancer.  These mutations can be passed down from the maternal or the paternal lineage.    Ms. Garcia reports that her daughter tested positive for a single heterozygous mutation in the MUTYH gene. The presence of only one MUTYH mutation indicates that an individual does NOT have MUTYH-Associated Polyposis (MAP), but is a carrier.  Unlike most hereditary cancer syndromes, MAP is inherited in an autosomal recessive manner; therefore, if someone is identified as having one MUTYH mutation, it does not place them at the significantly increased cancer risks that are seen when someone has two mutations, one in each copy of the gene.  Current NCCN guidelines (2022) report a 10-13% lifetime risk of colon cancer in individuals with one MUTYH mutation who have a first-degree relative with colon cancer and a 6-7% lifetime risk of colon cancer in individuals with one MUTYH mutation irrespective of family history. Because the general population MUTYH carrier frequency is 1-2%, it is possible  for individuals to have inherited an additional mutation. Therefore, rather than just looking for the identified mutation, full MUTYH analysis is indicated. For individuals found to have two MUTYH mutations, screening colonoscopy begins at age 25.      Given the extent of the family history, we discussed the availability of multigene panels that allow for testing of a number of cancer susceptibility genes simultaneously.  The additional genes included have varying degrees of risk associated, and some have also been associated with increased risks for other cancers. Ms. Garcia meets NCCN guidelines criteria for BRCA1/2 testing based on her family history of breast and ovarian cancer. A significant proportion of hereditary breast and ovarian cancer can be attributed to mutations in the BRCA1 and BRCA2 genes.  Mutations in these genes confer an increased risk for breast cancer, ovarian cancer, male breast cancer, prostate cancer, and pancreatic cancer. Women with a BRCA1 or BRCA2 mutation have up to an 87% lifetime risk of breast cancer and up to a 44% risk of ovarian cancer.  Given the family history and the possibility of additional genetic risk factors present in this family, Ms. Garcia opted to pursue multigene panel testing.     Ms. Garcia also reported that her daughter tested positive for a variant in the MTHFR gene (copy of report not provided for review). We reviewed the current information on MTHFR deficiency.  It follows an autosomal recessive inheritance pattern, meaning that an individual must inherit two variants, one in each copy of MTHFR, in order to be at risk of having related symptoms. Approximately 45% of the  population is heterozygous or a carrier of an MTHFR variant while 10-20% of the general population is homozygous or compound heterozygous for two variants in the MTHFR gene.  These are very common variants in the general population, and the vast majority of individuals who carry  these are unaffected by the variants.  In some individuals who have two mutations, this may be associated with elevated plasma homocysteine level, which may be a risk factor for coronary artery disease and venous thrombosis. According to the literature and a consensus statement from American College of Medical Genetics (ACMG), in the absence of elevated homocysteine levels, MTHFR variants appear to have no clinical relevance.  We discussed that the ACMG, ACOG, the American Heart Association, and other professional groups recommend against testing of MTHFR since the information does not impact medical management.    GENETIC TESTING:  The risks, benefits and limitations of genetic testing and implications for clinical management following testing were reviewed. DNA test results can influence decisions regarding screening and prevention.  Genetic testing can have significant psychological implications for both individuals and families. Also discussed was the possibility of employment and insurance discrimination based on genetic test results and the laws in place to prevent this ([place]), as well as the limitations of these laws.     Due to the family history, we discussed comprehensive multigene panel testing, which would allow for analysis of MUTYH as well as 35 additional genes associated with hereditary cancer risk. The benefits and limitations of genetic testing were discussed and Ms. Garcia decided to pursue genetic testing. The implications of a positive or negative test result were discussed. We discussed the possibility that, in some cases, genetic test results may be ambiguous due to the identification of a genetic variant of uncertain significance (VUS). These variants may or may not be associated with an increased cancer risk. If a VUS is identified, testing family members is typically not recommended and screening recommendations are made based on the family history. The laboratories that perform genetic  testing work to reclassify the VUS and send out an amended report if and when a VUS is reclassified. The majority of variant findings are ultimately reclassified to a negative result.     PLAN: Genetic testing was ordered via the [employer] panel through [employer]. Results are expected in 2-3 weeks. Ms. Garcia is welcome to contact us in the meantime at [phone] with any questions she may have.      Zara Tyler MS, [facility] [place] [facility]  Licensed Certified Genetic Counselor

## 2023-01-12 ENCOUNTER — DOCUMENTATION (OUTPATIENT)
Dept: GENETICS | Facility: HOSPITAL | Age: 58
End: 2023-01-12
Payer: MEDICAID

## 2023-01-12 NOTE — PROGRESS NOTES
Niesha Garcia, a 57-year old female, was referred for genetic counseling due to a family history of cancer. Ms. Garcia reports that her daughter recently tested positive for one mutation in the MUTYH gene.  Ms. Garcia was 17 years of age at menarche and her first live birth was at 23. She retains her uterus and ovaries. Her most recent mammogram was in 2018. She had her first colonoscopy in 2020 and reports that 3-4 polyps were removed. She was interested in discussing her risks for a hereditary cancer syndrome. Ms. Garcia was interested in pursuing genetic testing, and therefore, the CancerNext panel through [facility] was ordered which analyzes MUTYH and 35 additional genes associated with an increased cancer risk. The genes on this panel include APC, IRISH, AXIN2, BARD1, BMPR1A, BRCA1, BRCA2, BRIP1, CDH1, CDK4, CDKN2A, CHEK2, DICER1, EPCAM, GREM1, HOXB13, MLH1, MRE11A, MSH2, MSH3, MSH6, MUTYH, NBN, NF1, NTHL1, PALB2, PMS2, POLD1, POLE, PTEN, RAD51C, RAD51D, RECQL, SMAD4, SMARCA4, STK11, and TP53.  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 These results were discussed with Ms. Garcia by telephone on 1/12/2023.    FAMILY HISTORY: (See attached pedigree)   Mother:  Ovarian cancer, 30s; several colon polyps (at least 10-20 polyps over lifetime)  Mat. Uncle:  Colon cancer, d. 43  Father:   Mouth cancer, d. 67  Pat. Aunt:  Breast cancer, 40s  Pat. Aunt:  Breast cancer, 40s  Daughter:  MUTYH carrier (one mutation in MUTYH gene- copy of report not provided)    Medical records regarding the family history were not available for review.     RISK ASSESSMENT:  Ms. Garcia’s family history led to concern regarding a hereditary cancer syndrome.  She clearly meets NCCN guidelines criteria for BRCA1/2 testing given her maternal family history of ovarian cancer and paternal family history of breast cancer  diagnosed before age 50. Additionally, Ms. Garcia’s daughter is reported to have an MUTYH mutation, therefore testing of this gene is clinically indicated for Ms. Garcia. Ms. Garcia opted to pursue testing via a multigene panel.     GENETIC COUNSELING: We reviewed the family history information in detail. Cases of cancer follow three general patterns: sporadic, familial, and hereditary.  While most cancer is sporadic, some cases appear to occur in family clusters. These cases are said to be familial and account for 10-20% of cancer cases.  Familial cases may be due to a combination of shared genes and environmental factors among family members. In even fewer families (5-10%), the risk for cancer is inherited, and the genes responsible for the increased cancer risk are known.     Family histories typical of hereditary cancer syndromes usually include multiple first- and second-degree relatives diagnosed with cancer types that define a syndrome. These cases tend to be diagnosed at younger-than-expected ages and can be bilateral or multifocal.  The cancer in these families follows an autosomal dominant inheritance pattern, which indicates the likely presence of a mutation in a cancer susceptibility gene.  Children and siblings of an individual believed to carry this mutation have a 50% chance of inheriting that mutation, thereby inheriting the increased risk to develop cancer.  These mutations can be passed down from the maternal or the paternal lineage.    Ms. Garcia reports that her daughter tested positive for a single heterozygous mutation in the MUTYH gene. The presence of only one MUTYH mutation indicates that an individual does NOT have MUTYH-Associated Polyposis (MAP), but is a carrier.  Unlike most hereditary cancer syndromes, MAP is inherited in an autosomal recessive manner; therefore, if someone is identified as having one MUTYH mutation, it does not place them at the significantly increased cancer  risks that are seen when someone has two mutations, one in each copy of the gene.  Current NCCN guidelines (2022) report a 10-13% lifetime risk of colon cancer in individuals with one MUTYH mutation who have a first-degree relative with colon cancer and a 6-7% lifetime risk of colon cancer in individuals with one MUTYH mutation irrespective of family history. Because the general population MUTYH carrier frequency is 1-2%, it is possible for individuals to have inherited an additional mutation. Therefore, rather than just looking for the identified mutation, full MUTYH analysis is indicated. For individuals found to have two MUTYH mutations, screening colonoscopy begins at age 25.      Given the extent of the family history, we discussed the availability of multigene panels that allow for testing of a number of cancer susceptibility genes simultaneously.  The additional genes included have varying degrees of risk associated, and some have also been associated with increased risks for other cancers. Ms. Garcia meets NCCN guidelines criteria for BRCA1/2 testing based on her family history of breast and ovarian cancer. A significant proportion of hereditary breast and ovarian cancer can be attributed to mutations in the BRCA1 and BRCA2 genes.  Mutations in these genes confer an increased risk for breast cancer, ovarian cancer, male breast cancer, prostate cancer, and pancreatic cancer. Women with a BRCA1 or BRCA2 mutation have up to an 87% lifetime risk of breast cancer and up to a 44% risk of ovarian cancer.  Given the family history and the possibility of additional genetic risk factors present in this family, Ms. Garcia opted to pursue multigene panel testing.     Ms. Garcia also reported that her daughter tested positive for a variant in the MTHFR gene (copy of report not provided for review). We reviewed the current information on MTHFR deficiency.  It follows an autosomal recessive inheritance pattern, meaning  that an individual must inherit two variants, one in each copy of MTHFR, in order to be at risk of having related symptoms. Approximately 45% of the  population is heterozygous or a carrier of an MTHFR variant while 10-20% of the general population is homozygous or compound heterozygous for two variants in the MTHFR gene.  These are very common variants in the general population, and the vast majority of individuals who carry these are unaffected by the variants.  In some individuals who have two mutations, this may be associated with elevated plasma homocysteine level, which may be a risk factor for coronary artery disease and venous thrombosis. According to the literature and a consensus statement from American College of Medical Genetics (ACMG), in the absence of elevated homocysteine levels, MTHFR variants appear to have no clinical relevance.  We discussed that the ACMG, ACOG, the American Heart Association, and other professional groups recommend against testing of MTHFR since the information does not impact medical management.    GENETIC TESTING:  The risks, benefits and limitations of genetic testing and implications for clinical management following testing were reviewed. DNA test results can influence decisions regarding screening and prevention.  Genetic testing can have significant psychological implications for both individuals and families. Also discussed was the possibility of employment and insurance discrimination based on genetic test results and the laws in place to prevent this (RANDI), as well as the limitations of these laws.     Due to the family history, we discussed comprehensive multigene panel testing, which would allow for analysis of MUTYH as well as 35 additional genes associated with hereditary cancer risk. The benefits and limitations of genetic testing were discussed and Ms. Garcia decided to pursue genetic testing. The implications of a positive or negative test result were  discussed. We discussed the possibility that, in some cases, genetic test results may be ambiguous due to the identification of a genetic variant of uncertain significance (VUS). These variants may or may not be associated with an increased cancer risk. If a VUS is identified, testing family members is typically not recommended and screening recommendations are made based on the family history. The laboratories that perform genetic testing work to reclassify the VUS and send out an amended report if and when a VUS is reclassified. The majority of variant findings are ultimately reclassified to a negative result.     TEST RESULTS:  Genetic testing was positive for one likely pathogenic variant ([id number].R241Q) in the MUTYH gene (see attached results).  The presence of one MUTYH mutation indicates that Ms. Garcia does NOT have MUTYH-Associated Polyposis (MAP), but is a carrier.  Unlike most hereditary cancer syndromes, MAP is inherited in an autosomal recessive manner; therefore, Ms. Garcia being identified as having one MUTYH mutation does not place her at the significantly increased cancer risks that are seen when someone has two mutations, one in each copy of the gene. Current NCCN guidelines (2022) report a 10-13% lifetime risk of colon cancer in individuals with one MUTYH mutation who have a first-degree relative with colon cancer and a 6-7% lifetime risk of colon cancer in individuals with one MUTYH mutation irrespective of family history.     Based on Ms. Garcia’s test results, her siblings and children each have a 50% chance to have inherited the MUTYH mutation identified. The general population carrier frequency is 1-2%; therefore, it is possible for Ms. Garcia’s family members to have inherited an additional mutation from the other parent. Therefore, rather than just performing single site analysis for the identified mutation, full MUTYH analysis is indicated for family members. Genetic counseling and testing  could be considered for Ms. Garcia’s family members.  For individuals found to have two MUTYH mutations, screening colonoscopy begins at age 25.  Genetic testing for adult onset conditions is not recommended before age 18. We would be happy to see family members who live in the area in our clinic to further discuss this information and testing options. Relatives can call our office at [phone] for assistance in scheduling an appointment; however, a physician referral to our office will prompt our coordinator to contact patients to schedule an appointment.  For family members who live elsewhere, there are genetic counselors at most [facility]. They can find a genetic counselor by visiting the National Society of Genetic Counselors website at www.nsgc.org.      SURVEILLANCE:  Per current NCCN guidelines, individuals who are carriers of one MUTYH mutation and have a family history of colon cancer in a first-degree relative should begin screening colonoscopy at age 40 or ten years prior to the youngest colorectal cancer diagnosis in the family and repeat every five years.  Ms. Garcia does not have a known history of colon cancer in a first-degree relative; however, she does report a significant history of polyps in her mother, therefore her frequency of colonoscopy screening should be based on her personal and family history of colon polyps.     At this time, Ms. Garcia’s lifetime risk of developing breast cancer should be assessed using family history-based risk assessment models that take into account personal history factors (age at menarche, age at first live birth, etc.) and family history information. Using these computer models, Ms. Garcia’s lifetime breast cancer risk is estimated to be up to 9.3% (SUNNY/Gay), compared to the average 55-year-old female’s lifetime risk of 8.4%. A risk greater than 20% warrants consideration of increased screening per NCCN guidelines; Ms. Garcia does  not fall into this category. Per NCCN guidelines, it is appropriate for her to continue to follow general population screening guidelines for her breast cancer risk including annual clinical breast exam and annual mammogram. This assessment is based on the information provided at the time of the appointment and could change should new information be obtained.    PLAN:  Genetic counseling remains available to Ms. Garcia and her family. She is welcome to contact our office with any questions or concerns at [phone].       Zara Tyler MS, [facility] [place], Located within [facility]  Licensed Certified Genetic Counselor      Cc: CASSIDY Magaña

## 2023-01-17 ENCOUNTER — OFFICE VISIT (OUTPATIENT)
Dept: NEUROLOGY | Facility: CLINIC | Age: 58
End: 2023-01-17
Payer: MEDICAID

## 2023-01-17 VITALS
SYSTOLIC BLOOD PRESSURE: 98 MMHG | DIASTOLIC BLOOD PRESSURE: 64 MMHG | HEIGHT: 66 IN | WEIGHT: 110 LBS | BODY MASS INDEX: 17.68 KG/M2 | OXYGEN SATURATION: 96 % | HEART RATE: 94 BPM

## 2023-01-17 DIAGNOSIS — G43.109 MIGRAINE WITH AURA AND WITHOUT STATUS MIGRAINOSUS, NOT INTRACTABLE: Primary | ICD-10-CM

## 2023-01-17 PROBLEM — G25.0 TREMOR, ESSENTIAL: Status: ACTIVE | Noted: 2023-01-17

## 2023-01-17 PROCEDURE — 99213 OFFICE O/P EST LOW 20 MIN: CPT | Performed by: NURSE PRACTITIONER

## 2023-01-17 RX ORDER — ERGOCALCIFEROL (VITAMIN D2) 10 MCG
400 TABLET ORAL DAILY
COMMUNITY

## 2023-01-17 RX ORDER — AMITRIPTYLINE HYDROCHLORIDE 10 MG/1
TABLET, FILM COATED ORAL
Qty: 90 TABLET | Refills: 3 | Status: SHIPPED | OUTPATIENT
Start: 2023-01-17

## 2023-01-17 RX ORDER — DIPHENOXYLATE HYDROCHLORIDE AND ATROPINE SULFATE 2.5; .025 MG/1; MG/1
TABLET ORAL DAILY
COMMUNITY

## 2023-01-17 RX ORDER — RIZATRIPTAN BENZOATE 5 MG/1
TABLET ORAL
Qty: 9 TABLET | Refills: 11 | Status: SHIPPED | OUTPATIENT
Start: 2023-01-17

## 2023-01-17 NOTE — LETTER
"January 17, 2023     CASSIDY Rothman  [address]  [address]  [facility] [address]    Patient: Niesha Garcia   YOB: 1965   Date of Visit: 1/17/2023       Dear CASSIDY Rothman    Niesha Garcia was in my office today. Below is a copy of my note.    If you have questions, please do not hesitate to call me. I look forward to following Niesha along with you.         Sincerely,        CASSIDY Maier        CC: No Recipients    Subjective:     Patient ID: Niesha Garcia is a 57 y.o. female.    CC:   Chief Complaint   Patient presents with   • Migraine       HPI:   History of Present Illness   Today 1/17/2023-  This is a 57-year-old female who presents for 1 year neurology follow-up. She was previously seen in clinic on 02/23/2022 by CASSIDY Doe, and then prior to that followed long term in clinic by Dr. Joey Goyal, neurology. She has been following for migraine headaches and nonepileptic seizures for several years. She has been on low dose amitriptyline for migraine prevention. She did undergo an MRI of the brain without contrast on 01/22/2019, which was read as normal by radiology. She did undergo an EEG on 08/09/2019, which was normal. Routine was read as normal and previous EEG on 07/16/2015 was also read as normal. She is here for follow-up and reevaluation of migraines. At last visit in clinic, she requested a referral for genetics since she has a strong family history of colon cancer, breast cancer, and ovarian cancer, so she was referred for that evaluation as well.    She reports that her headaches have been \" not too bad. \" She states that she had a headache yesterday, 01/16/2023, and that is why she did not wash her hair because it is super sensitive. She states that she tries to \"pass through it and not to agitate it\". She reports that she has a little nausea with the headaches. She mentions that she has really good hearing " and is able to hear a lot of stuff, but it does not bother her when a headache occurs. She does have sensitivity to light when experiencing the headaches. She states that she will experience tunnel vision with the headaches where her vision goes from wide to narrow. She confirms that she has a bowel issue with it where she would get diarrhea with really bad pains and that it usually leads to her ending up on the floor because her blood sugar drops. She reports that she has been treating her blood sugar all these years, but it did not help with her headaches. She states that it is more like an abdominal type migraine where the vagal response being affected. She would experience those types of spells between 4 and 6 times in a year since she has been on the amitriptyline, which is a big improvement. The type of migraines she would have used to occur 4 to 6 times per week before starting the medication. She notes that she can get the onsets of a headache where she gets numbness and cramping in her hands and in her jaw. She states that it will usually not go to that point unless her stomach starts to present symptoms. She notes that she used to take a sugar pill due to her sugar becoming low and they thought it was the sugar levels causing the problem until further examination was performed. She has never taken any triptans prior, despite being prescribed it previously by Dr. Goyal for as needed sumatriptan per prior neuro notes. She states that she has been struggling with this issue since she was 10 years old.     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 She states that she is doing much better than she was in the past. She adds that she has seen [facility] in the past as well. She states that they always misdiagnosed it as a sugar level problem.  She states that she is getting to the point where she is going to get her eyes checked once a  "year. She states that she is going to get her colonoscopy performed again and keep up with her \"maintenance\" appointments to keep up with her health.     The following portions of the patient's history were reviewed and updated as appropriate: allergies, current medications, past family history, past medical history, past social history, past surgical history and problem list.    Past Medical History:   Diagnosis Date   • Migraine    • Numbness        History reviewed. No pertinent surgical history.    Social History     Socioeconomic History   • Marital status:    Tobacco Use   • Smoking status: Every Day   • Smokeless tobacco: Never   Vaping Use   • Vaping Use: Never used   Substance and Sexual Activity   • Alcohol use: No   • Drug use: Defer   • Sexual activity: Defer       Family History   Problem Relation Age of Onset   • Alcohol abuse Other    • Cancer Other    • Migraines Mother           Current Outpatient Medications:   •  amitriptyline (ELAVIL) 10 MG tablet, Take 1 PO qhs, Disp: 90 tablet, Rfl: 3  •  multivitamin (MULTI-VITAMIN DAILY PO), Take  by mouth Daily., Disp: , Rfl:   •  rizatriptan (MAXALT) 5 MG tablet, Take 1 tablet at onset of migraine as needed, May repeat in 2 hours if needed. Max 2 in a day, Disp: 9 tablet, Rfl: 11  •  Vitamin D, Cholecalciferol, (CHOLECALCIFEROL) 10 MCG (400 UNIT) tablet, Take 400 Units by mouth Daily., Disp: , Rfl:      Review of Systems   Neurological: Positive for headaches.   All other systems reviewed and are negative.       Objective:  BP 98/64   Pulse 94   Ht 167.6 cm (65.98\")   Wt 49.9 kg (110 lb)   SpO2 96%   BMI 17.76 kg/m²     Neurologic Exam     Mental Status   Oriented to person, place, and time.   Speech: speech is normal   Level of consciousness: alert    Cranial Nerves   Cranial nerves II through XII intact.     Motor Exam   Muscle bulk: normal  Overall muscle tone: normal    Strength   Strength 5/5 throughout.     Gait, Coordination, and Reflexes "     Gait  Gait: normal    Coordination   Finger to nose coordination: normal    Tremor   Resting tremor: absent  Intention tremor: absent  Action tremor: absent    Reflexes   Reflexes 2+ except as noted.   Right : 2+  Left : 2+      Physical Exam  Constitutional:       Appearance: Normal appearance.      Comments: BMI 17.8   Neurological:      Mental Status: She is alert and oriented to person, place, and time.      Cranial Nerves: Cranial nerves 2-12 are intact.      Motor: Motor strength is normal.      Coordination: Finger-Nose-Finger Test normal.      Gait: Gait is intact.   Psychiatric:         Mood and Affect: Mood normal.         Speech: Speech normal.         Behavior: Behavior normal.         Thought Content: Thought content normal.         Cognition and Memory: Cognition and memory normal.         Judgment: Judgment normal.         Assessment/Plan:      Diagnoses and all orders for this visit:    1. Migraine with aura and without status migrainosus, not intractable (Primary)  -     amitriptyline (ELAVIL) 10 MG tablet; Take 1 PO qhs  Dispense: 90 tablet; Refill: 3  -     rizatriptan (MAXALT) 5 MG tablet; Take 1 tablet at onset of migraine as needed, May repeat in 2 hours if needed. Max 2 in a day  Dispense: 9 tablet; Refill: 11    She is doing really well overall. It appears that she has had these migraine type episodes since she was 10 years of age. She was previously seen by the [facility] and diagnosed with nonepileptic spells. Essentially, she has had severe vasovagal symptoms with her migraines, which have now been well controlled with the low dose amitriptyline 10 mg daily. She was previously in 2015, prescribed sumatriptan, but has not been on any abortive migraine specific medications since that time. We have discussed adding rizatriptan to take at onset of migraine symptoms, and she is aware of how this will work for her. She will continue her current medication. She will follow  up in our clinic in 1 year or sooner if needed. She feels that she is doing very well, and only having 4 to 6 episodes per year versus previously she was having 4 to 6 episodes or more in a week. She also tells me that she used to have numbness, tingling, and drawing up in both hands during her episodes, but has this no longer. She does continue to have a visual aura with a wide to narrow change in her vision. She does get significant photophobia, no phonophobia. She does get nausea. She does have some throbbing sensation, but more abdominal symptoms with diarrhea during the migraine episodes. She is very pleased with her progress and treatment overall, and wishes to keep the amitriptyline the same. She will call us with any additional questions or concerns prior to follow up in clinic.    Reviewed medications, potential side effects and signs and symptoms to report. Discussed risk versus benefits of treatment plan with patient and/or family-including medications, labs and radiology that may be ordered. Addressed questions and concerns during visit. Patient and/or family verbalized understanding and agree with plan.    AS THE PROVIDER, I PERSONALLY WORE PPE DURING ENTIRE FACE TO FACE ENCOUNTER IN CLINIC WITH THE PATIENT. PATIENT ALSO WORE PPE DURING ENTIRE FACE TO FACE ENCOUNTER EXCEPT FOR A MAX OF 30 SECONDS DURING NEUROLOGICAL EVALUATION OF CRANIAL NERVES AND THEN MASK WAS PLACED BACK OVER PATIENT FACE FOR REMAINDER OF VISIT. I WASHED MY HANDS BEFORE AND AFTER VISIT.    During this visit the following were done:  Labs Reviewed []    Labs Ordered []    Radiology Reports Reviewed [x]    Radiology Ordered []    PCP Records Reviewed []    Referring Provider Records Reviewed []    ER Records Reviewed []    Hospital Records Reviewed []    History Obtained From Family []    Radiology Images Reviewed []    Other Reviewed [x] prior neurology notes and records, EEG   Records Requested []      Transcribed from ambient  dictation for CASSIDY Maier by Soo Leger.  01/17/23   11:45 EST    Patient or patient representative verbalized consent to the visit recording.  I have personally performed the services described in this document as transcribed by the above individual, and it is both accurate and complete.  Karrie Najera, CASSIDY  1/17/2023  12:30 EST

## 2023-01-17 NOTE — PROGRESS NOTES
"Subjective:     Patient ID: Niesha  Jose is a 57 y.o. female.    CC:   Chief Complaint   Patient presents with   • Migraine       HPI:   History of Present Illness   Today 1/17/2023-  This is a 57-year-old female who presents for 1 year neurology follow-up. She was previously seen in clinic on 02/23/2022 by CASSIDY Doe, and then prior to that followed long term in clinic by Dr. Joey Goyal, neurology. She has been following for migraine headaches and nonepileptic seizures for several years. She has been on low dose amitriptyline for migraine prevention. She did undergo an MRI of the brain without contrast on 01/22/2019, which was read as normal by radiology. She did undergo an EEG on 08/09/2019, which was normal. Routine was read as normal and previous EEG on 07/16/2015 was also read as normal. She is here for follow-up and reevaluation of migraines. At last visit in clinic, she requested a referral for genetics since she has a strong family history of colon cancer, breast cancer, and ovarian cancer, so she was referred for that evaluation as well.    She reports that her headaches have been \" not too bad. \" She states that she had a headache yesterday, 01/16/2023, and that is why she did not wash her hair because it is super sensitive. She states that she tries to \"pass through it and not to agitate it\". She reports that she has a little nausea with the headaches. She mentions that she has really good hearing and is able to hear a lot of stuff, but it does not bother her when a headache occurs. She does have sensitivity to light when experiencing the headaches. She states that she will experience tunnel vision with the headaches where her vision goes from wide to narrow. She confirms that she has a bowel issue with it where she would get diarrhea with really bad pains and that it usually leads to her ending up on the floor because her blood sugar drops. She reports that she has been " "treating her blood sugar all these years, but it did not help with her headaches. She states that it is more like an abdominal type migraine where the vagal response being affected. She would experience those types of spells between 4 and 6 times in a year since she has been on the amitriptyline, which is a big improvement. The type of migraines she would have used to occur 4 to 6 times per week before starting the medication. She notes that she can get the onsets of a headache where she gets numbness and cramping in her hands and in her jaw. She states that it will usually not go to that point unless her stomach starts to present symptoms. She notes that she used to take a sugar pill due to her sugar becoming low and they thought it was the sugar levels causing the problem until further examination was performed. She has never taken any triptans prior, despite being prescribed it previously by Dr. Goyal for as needed sumatriptan per prior neuro notes. She states that she has been struggling with this issue since she was 10 years old.     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 She states that she is doing much better than she was in the past. She adds that she has seen [facility] in the past as well. She states that they always misdiagnosed it as a sugar level problem.  She states that she is getting to the point where she is going to get her eyes checked once a year. She states that she is going to get her colonoscopy performed again and keep up with her \"maintenance\" appointments to keep up with her health.     The following portions of the patient's history were reviewed and updated as appropriate: allergies, current medications, past family history, past medical history, past social history, past surgical history and problem list.    Past Medical History:   Diagnosis Date   • Migraine    • Numbness        History reviewed. No pertinent " "surgical history.    Social History     Socioeconomic History   • Marital status:    Tobacco Use   • Smoking status: Every Day   • Smokeless tobacco: Never   Vaping Use   • Vaping Use: Never used   Substance and Sexual Activity   • Alcohol use: No   • Drug use: Defer   • Sexual activity: Defer       Family History   Problem Relation Age of Onset   • Alcohol abuse Other    • Cancer Other    • Migraines Mother           Current Outpatient Medications:   •  amitriptyline (ELAVIL) 10 MG tablet, Take 1 PO qhs, Disp: 90 tablet, Rfl: 3  •  multivitamin (MULTI-VITAMIN DAILY PO), Take  by mouth Daily., Disp: , Rfl:   •  rizatriptan (MAXALT) 5 MG tablet, Take 1 tablet at onset of migraine as needed, May repeat in 2 hours if needed. Max 2 in a day, Disp: 9 tablet, Rfl: 11  •  Vitamin D, Cholecalciferol, (CHOLECALCIFEROL) 10 MCG (400 UNIT) tablet, Take 400 Units by mouth Daily., Disp: , Rfl:      Review of Systems   Neurological: Positive for headaches.   All other systems reviewed and are negative.       Objective:  BP 98/64   Pulse 94   Ht 167.6 cm (65.98\")   Wt 49.9 kg (110 lb)   SpO2 96%   BMI 17.76 kg/m²     Neurologic Exam     Mental Status   Oriented to person, place, and time.   Speech: speech is normal   Level of consciousness: alert    Cranial Nerves   Cranial nerves II through XII intact.     Motor Exam   Muscle bulk: normal  Overall muscle tone: normal    Strength   Strength 5/5 throughout.     Gait, Coordination, and Reflexes     Gait  Gait: normal    Coordination   Finger to nose coordination: normal    Tremor   Resting tremor: absent  Intention tremor: absent  Action tremor: absent    Reflexes   Reflexes 2+ except as noted.   Right : 2+  Left : 2+      Physical Exam  Constitutional:       Appearance: Normal appearance.      Comments: BMI 17.8   Neurological:      Mental Status: She is alert and oriented to person, place, and time.      Cranial Nerves: Cranial nerves 2-12 are intact.      " Motor: Motor strength is normal.      Coordination: Finger-Nose-Finger Test normal.      Gait: Gait is intact.   Psychiatric:         Mood and Affect: Mood normal.         Speech: Speech normal.         Behavior: Behavior normal.         Thought Content: Thought content normal.         Cognition and Memory: Cognition and memory normal.         Judgment: Judgment normal.         Assessment/Plan:       Diagnoses and all orders for this visit:    1. Migraine with aura and without status migrainosus, not intractable (Primary)  -     amitriptyline (ELAVIL) 10 MG tablet; Take 1 PO qhs  Dispense: 90 tablet; Refill: 3  -     rizatriptan (MAXALT) 5 MG tablet; Take 1 tablet at onset of migraine as needed, May repeat in 2 hours if needed. Max 2 in a day  Dispense: 9 tablet; Refill: 11    She is doing really well overall. It appears that she has had these migraine type episodes since she was 10 years of age. She was previously seen by the [facility] and diagnosed with nonepileptic spells. Essentially, she has had severe vasovagal symptoms with her migraines, which have now been well controlled with the low dose amitriptyline 10 mg daily. She was previously in 2015, prescribed sumatriptan, but has not been on any abortive migraine specific medications since that time. We have discussed adding rizatriptan to take at onset of migraine symptoms, and she is aware of how this will work for her. She will continue her current medication. She will follow up in our clinic in 1 year or sooner if needed. She feels that she is doing very well, and only having 4 to 6 episodes per year versus previously she was having 4 to 6 episodes or more in a week. She also tells me that she used to have numbness, tingling, and drawing up in both hands during her episodes, but has this no longer. She does continue to have a visual aura with a wide to narrow change in her vision. She does get significant photophobia, no phonophobia. She does get  nausea. She does have some throbbing sensation, but more abdominal symptoms with diarrhea during the migraine episodes. She is very pleased with her progress and treatment overall, and wishes to keep the amitriptyline the same. She will call us with any additional questions or concerns prior to follow up in clinic.     Reviewed medications, potential side effects and signs and symptoms to report. Discussed risk versus benefits of treatment plan with patient and/or family-including medications, labs and radiology that may be ordered. Addressed questions and concerns during visit. Patient and/or family verbalized understanding and agree with plan.    AS THE PROVIDER, I PERSONALLY WORE PPE DURING ENTIRE FACE TO FACE ENCOUNTER IN CLINIC WITH THE PATIENT. PATIENT ALSO WORE PPE DURING ENTIRE FACE TO FACE ENCOUNTER EXCEPT FOR A MAX OF 30 SECONDS DURING NEUROLOGICAL EVALUATION OF CRANIAL NERVES AND THEN MASK WAS PLACED BACK OVER PATIENT FACE FOR REMAINDER OF VISIT. I WASHED MY HANDS BEFORE AND AFTER VISIT.    During this visit the following were done:  Labs Reviewed []    Labs Ordered []    Radiology Reports Reviewed [x]    Radiology Ordered []    PCP Records Reviewed []    Referring Provider Records Reviewed []    ER Records Reviewed []    Hospital Records Reviewed []    History Obtained From Family []    Radiology Images Reviewed []    Other Reviewed [x] prior neurology notes and records, EEG   Records Requested []      Transcribed from ambient dictation for CASSIDY Maier by Soo Leger.  01/17/23   11:45 EST    Patient or patient representative verbalized consent to the visit recording.  I have personally performed the services described in this document as transcribed by the above individual, and it is both accurate and complete.  CASSIDY Maier  1/17/2023  12:30 EST

## 2024-02-01 ENCOUNTER — OFFICE VISIT (OUTPATIENT)
Dept: NEUROLOGY | Facility: CLINIC | Age: 59
End: 2024-02-01
Payer: MEDICAID

## 2024-02-01 VITALS
HEART RATE: 80 BPM | OXYGEN SATURATION: 98 % | SYSTOLIC BLOOD PRESSURE: 99 MMHG | HEIGHT: 67 IN | BODY MASS INDEX: 17.42 KG/M2 | DIASTOLIC BLOOD PRESSURE: 60 MMHG | WEIGHT: 111 LBS

## 2024-02-01 DIAGNOSIS — F44.5 PSYCHOGENIC NONEPILEPTIC SEIZURE: Primary | ICD-10-CM

## 2024-02-01 DIAGNOSIS — G43.109 MIGRAINE WITH AURA AND WITHOUT STATUS MIGRAINOSUS, NOT INTRACTABLE: ICD-10-CM

## 2024-02-01 PROBLEM — M75.100 ROTATOR CUFF SYNDROME: Status: ACTIVE | Noted: 2024-02-01

## 2024-02-01 PROBLEM — M54.2 NECK PAIN: Status: ACTIVE | Noted: 2024-02-01

## 2024-02-01 PROCEDURE — 1159F MED LIST DOCD IN RCRD: CPT | Performed by: NURSE PRACTITIONER

## 2024-02-01 PROCEDURE — 99214 OFFICE O/P EST MOD 30 MIN: CPT | Performed by: NURSE PRACTITIONER

## 2024-02-01 PROCEDURE — 1160F RVW MEDS BY RX/DR IN RCRD: CPT | Performed by: NURSE PRACTITIONER

## 2024-02-01 RX ORDER — AMITRIPTYLINE HYDROCHLORIDE 10 MG/1
TABLET, FILM COATED ORAL
Qty: 90 TABLET | Refills: 3 | Status: SHIPPED | OUTPATIENT
Start: 2024-02-01

## 2024-02-01 NOTE — LETTER
"February 2, 2024     CASSIDY Rothman  [address]  [address] Floor  [facility] [address]    Patient: Niesha Garcia   YOB: 1965   Date of Visit: 2/1/2024       Dear CASSIDY Rothman    Niesha Garcia was in my office today. Below is a copy of my note.    If you have questions, please do not hesitate to call me. I look forward to following Niesha along with you.         Sincerely,        CASSIDY Maier        CC: No Recipients    Subjective:     Patient ID: Niesha Garcia is a 58 y.o. female.    CC:   Chief Complaint   Patient presents with   • Migraine       HPI:   History of Present Illness  Today 2/1/2024-  This is a 58-year-old female who presents for 1-year neurology follow-up on migraine headaches and nonepileptic seizures present for many years. She has been taking amitriptyline 10 mg at night for migraine prevention. She has been prescribed rizatriptan at onset of migraine. She was previously seen by the [facility] and diagnosed with nonepileptic seizures. Her episodes have decreased significantly. She is here for follow-up and refills on her medications today.    Today, she reports she is doing well. She denies any changes in her health, surgeries, or hospitalizations since her last visit.    She reports her headaches are intermittent, but they are manageable. She has not had the need to take the rizatriptan when she gets a migraine. She has her \"little spouts\" occasionally, but they are not debilitating. She states it \"puts her on the floor for a while,\" but they have improved. She does not get migraines that last for a long time. Her mother gets migraines that \"put her in bed for days,\" but she gets \"spouts\" where she uses the restroom, gets very hot, and has to take her clothes off because she is soaking wet. She can usually feel this coming on with the numbness. She started getting tunnel vision, and she lays on the floor because she " "is hot. She lays there and waits for it to go away. They usually do not come with a headache. She has the spells once or twice a month. She notes stress will bring the spells on, but she has a very stress-free life, and this is helpful in reducing these.    She states she gets headaches, but they seem to be related to her vision or getting too much sunlight. It starts in her eyes, and that is when she gets the headache across the forehead. She takes 2 ibuprofen, and her headache usually resolves.    She reports since she is having a reaction to sunlight, she cannot go out in the sun for more than 20 minutes, and she starts getting dizzy spells. She is not sure if it is caused by the medication because she did not experience this in the past. She notes she has \"strange reactions\" to medications. She has experienced photosensitivity since she has been taking the amitriptyline for the last 3 years. She used to spend all day out in the sun, and it would not bother her. She has low blood pressure, but she has always had this. She does not see cardiology for her episodes.    She does not have any plans to quit smoking, but she has started smoking less.    Her primary care provider is CASSIDY Rothman.    She has the MTHFR gene but does not take folate.    She denies having any new concerns or questions.    Prior Neurological History and Workup:  She has been following for migraine headaches and nonepileptic seizures for several years.   She has been on low dose amitriptyline for migraine prevention. Prescribed sumatriptan and rizatriptan. Does not feel she needs this.   She did undergo an MRI of the brain without contrast on 01/22/2019, which was read as normal by radiology.   She did undergo an EEG on 08/09/2019, which was normal. Routine was read as normal and previous EEG on 07/16/2015 was also read as normal.      Non-epileptic seizures.She states that she has been struggling with this issue since she was 10 " "years old.      The following portions of the patient's history were reviewed and updated as appropriate: allergies, current medications, past family history, past medical history, past social history, past surgical history, and problem list.    Past Medical History:   Diagnosis Date   • Migraine    • Numbness        History reviewed. No pertinent surgical history.    Social History     Socioeconomic History   • Marital status:    Tobacco Use   • Smoking status: Every Day   • Smokeless tobacco: Never   Vaping Use   • Vaping Use: Never used   Substance and Sexual Activity   • Alcohol use: No   • Drug use: Defer   • Sexual activity: Not Currently       Family History   Problem Relation Age of Onset   • Alcohol abuse Other    • Cancer Other    • Migraines Mother           Current Outpatient Medications:   •  amitriptyline (ELAVIL) 10 MG tablet, Take 1 PO qhs, Disp: 90 tablet, Rfl: 3  •  multivitamin (MULTI-VITAMIN DAILY PO), Take  by mouth Daily., Disp: , Rfl:   •  Vitamin D, Cholecalciferol, (CHOLECALCIFEROL) 10 MCG (400 UNIT) tablet, Take 400 Units by mouth Daily., Disp: , Rfl:      Review of Systems   Neurological:  Positive for headaches.   All other systems reviewed and are negative.       Objective:  BP 99/60   Pulse 80   Ht 170.2 cm (67\")   Wt 50.3 kg (111 lb)   SpO2 98%   BMI 17.39 kg/m²     Neurologic Exam     Mental Status   Oriented to person, place, and time.   Speech: speech is normal   Level of consciousness: alert    Cranial Nerves   Cranial nerves II through XII intact.     Motor Exam   Muscle bulk: normal  Overall muscle tone: normal    Strength   Strength 5/5 throughout.     Gait, Coordination, and Reflexes     Gait  Gait: normal    Coordination   Finger to nose coordination: normal    Tremor   Resting tremor: absent  Intention tremor: absent  Action tremor: absent    Reflexes   Right : 2+  Left : 2+      Physical Exam  Constitutional:       Appearance: Normal appearance.      " Comments: Very thin, no acute distress. BMI 17.4   Neurological:      Mental Status: She is alert and oriented to person, place, and time.      Cranial Nerves: Cranial nerves 2-12 are intact.      Motor: Motor strength is normal.     Coordination: Finger-Nose-Finger Test normal.      Gait: Gait is intact.   Psychiatric:         Mood and Affect: Mood is anxious.         Speech: Speech normal.         Behavior: Behavior normal.         Thought Content: Thought content normal.         Cognition and Memory: Cognition and memory normal.         Judgment: Judgment normal.       Assessment/Plan:       Diagnoses and all orders for this visit:    1. Psychogenic nonepileptic seizure (Primary)  Comments:  1-2 per month, long term and stable    2. Migraine with aura and without status migrainosus, not intractable  -     amitriptyline (ELAVIL) 10 MG tablet; Take 1 PO qhs  Dispense: 90 tablet; Refill: 3         She would like to continue her amitriptyline unchanged. She is not taking rizatriptan. She takes 2 ibuprofen when she gets a migraine and feels like this is effective. She does not report any debilitating migraines; these are rare. Her nonepileptic spells only occur when she is home, and she lays on the floor during these. These have improved. She does report photosensitivity and dizziness when she goes into the light. Did discuss she could consider reducing her amitriptyline to 5 mg nightly, and she would like to wait until the summer months and try this, and she will be in contact with me about how she does. We could consider weaning her off completely. Also encouraged her to discuss with PCP l-methylfolate treatment for MTHFR gene. She has no additional questions or concerns. She is going to get her eyes checked today. She verbalizes understanding and agrees with plan moving forward today.    Reviewed medications, potential side effects and signs and symptoms to report. Discussed risk versus benefits of treatment plan  with patient and/or family-including medications, labs and radiology that may be ordered. Addressed questions and concerns during visit. Patient and/or family verbalized understanding and agree with plan.    During this visit the following were done:  Labs Reviewed []    Labs Ordered []    Radiology Reports Reviewed []    Radiology Ordered []    PCP Records Reviewed []    Referring Provider Records Reviewed []    ER Records Reviewed []    Hospital Records Reviewed []    History Obtained From Family []    Radiology Images Reviewed []    Other Reviewed [x]    Records Requested []      Transcribed from ambient dictation for CASSIDY Maier by Kenia Johnson.  02/01/24   12:34 EST    Patient or patient representative verbalized consent to the visit recording.  I have personally performed the services described in this document as transcribed by the above individual, and it is both accurate and complete.  CASSIDY Maier  2/2/2024  05:16 EST     Note to patient: The 21st Century Cures Act makes medical notes like these available to patients in the interest of transparency. However, be advised this is a medical document. It is intended as peer to peer communication. It is written in medical language and may contain abbreviations or verbiage that are unfamiliar. It may appear blunt or direct. Medical documents are intended to carry relevant information, facts as evident, and the clinical opinion of the provider.

## 2024-02-01 NOTE — PROGRESS NOTES
"Subjective:     Patient ID: Niesha  Jose is a 58 y.o. female.    CC:   Chief Complaint   Patient presents with    Migraine       HPI:   History of Present Illness  Today 2/1/2024-  This is a 58-year-old female who presents for 1-year neurology follow-up on migraine headaches and nonepileptic seizures present for many years. She has been taking amitriptyline 10 mg at night for migraine prevention. She has been prescribed rizatriptan at onset of migraine. She was previously seen by the [facility] and diagnosed with nonepileptic seizures. Her episodes have decreased significantly. She is here for follow-up and refills on her medications today.    Today, she reports she is doing well. She denies any changes in her health, surgeries, or hospitalizations since her last visit.    She reports her headaches are intermittent, but they are manageable. She has not had the need to take the rizatriptan when she gets a migraine. She has her \"little spouts\" occasionally, but they are not debilitating. She states it \"puts her on the floor for a while,\" but they have improved. She does not get migraines that last for a long time. Her mother gets migraines that \"put her in bed for days,\" but she gets \"spouts\" where she uses the restroom, gets very hot, and has to take her clothes off because she is soaking wet. She can usually feel this coming on with the numbness. She started getting tunnel vision, and she lays on the floor because she is hot. She lays there and waits for it to go away. They usually do not come with a headache. She has the spells once or twice a month. She notes stress will bring the spells on, but she has a very stress-free life, and this is helpful in reducing these.    She states she gets headaches, but they seem to be related to her vision or getting too much sunlight. It starts in her eyes, and that is when she gets the headache across the forehead. She takes 2 ibuprofen, and her headache " "usually resolves.    She reports since she is having a reaction to sunlight, she cannot go out in the sun for more than 20 minutes, and she starts getting dizzy spells. She is not sure if it is caused by the medication because she did not experience this in the past. She notes she has \"strange reactions\" to medications. She has experienced photosensitivity since she has been taking the amitriptyline for the last 3 years. She used to spend all day out in the sun, and it would not bother her. She has low blood pressure, but she has always had this. She does not see cardiology for her episodes.    She does not have any plans to quit smoking, but she has started smoking less.    Her primary care provider is CASSIDY Rothman.    She has the MTHFR gene but does not take folate.    She denies having any new concerns or questions.    Prior Neurological History and Workup:  She has been following for migraine headaches and nonepileptic seizures for several years.   She has been on low dose amitriptyline for migraine prevention. Prescribed sumatriptan and rizatriptan. Does not feel she needs this.   She did undergo an MRI of the brain without contrast on 01/22/2019, which was read as normal by radiology.   She did undergo an EEG on 08/09/2019, which was normal. Routine was read as normal and previous EEG on 07/16/2015 was also read as normal.      Non-epileptic seizures.She states that she has been struggling with this issue since she was 10 years old.      The following portions of the patient's history were reviewed and updated as appropriate: allergies, current medications, past family history, past medical history, past social history, past surgical history, and problem list.    Past Medical History:   Diagnosis Date    Migraine     Numbness        History reviewed. No pertinent surgical history.    Social History     Socioeconomic History    Marital status:    Tobacco Use    Smoking status: Every Day    " "Smokeless tobacco: Never   Vaping Use    Vaping Use: Never used   Substance and Sexual Activity    Alcohol use: No    Drug use: Defer    Sexual activity: Not Currently       Family History   Problem Relation Age of Onset    Alcohol abuse Other     Cancer Other     Migraines Mother           Current Outpatient Medications:     amitriptyline (ELAVIL) 10 MG tablet, Take 1 PO qhs, Disp: 90 tablet, Rfl: 3    multivitamin (MULTI-VITAMIN DAILY PO), Take  by mouth Daily., Disp: , Rfl:     Vitamin D, Cholecalciferol, (CHOLECALCIFEROL) 10 MCG (400 UNIT) tablet, Take 400 Units by mouth Daily., Disp: , Rfl:      Review of Systems   Neurological:  Positive for headaches.   All other systems reviewed and are negative.       Objective:  BP 99/60   Pulse 80   Ht 170.2 cm (67\")   Wt 50.3 kg (111 lb)   SpO2 98%   BMI 17.39 kg/m²     Neurologic Exam     Mental Status   Oriented to person, place, and time.   Speech: speech is normal   Level of consciousness: alert    Cranial Nerves   Cranial nerves II through XII intact.     Motor Exam   Muscle bulk: normal  Overall muscle tone: normal    Strength   Strength 5/5 throughout.     Gait, Coordination, and Reflexes     Gait  Gait: normal    Coordination   Finger to nose coordination: normal    Tremor   Resting tremor: absent  Intention tremor: absent  Action tremor: absent    Reflexes   Right : 2+  Left : 2+      Physical Exam  Constitutional:       Appearance: Normal appearance.      Comments: Very thin, no acute distress. BMI 17.4   Neurological:      Mental Status: She is alert and oriented to person, place, and time.      Cranial Nerves: Cranial nerves 2-12 are intact.      Motor: Motor strength is normal.     Coordination: Finger-Nose-Finger Test normal.      Gait: Gait is intact.   Psychiatric:         Mood and Affect: Mood is anxious.         Speech: Speech normal.         Behavior: Behavior normal.         Thought Content: Thought content normal.         Cognition and " Memory: Cognition and memory normal.         Judgment: Judgment normal.       Assessment/Plan:       Diagnoses and all orders for this visit:    1. Psychogenic nonepileptic seizure (Primary)  Comments:  1-2 per month, long term and stable    2. Migraine with aura and without status migrainosus, not intractable  -     amitriptyline (ELAVIL) 10 MG tablet; Take 1 PO qhs  Dispense: 90 tablet; Refill: 3         She would like to continue her amitriptyline unchanged. She is not taking rizatriptan. She takes 2 ibuprofen when she gets a migraine and feels like this is effective. She does not report any debilitating migraines; these are rare. Her nonepileptic spells only occur when she is home, and she lays on the floor during these. These have improved. She does report photosensitivity and dizziness when she goes into the light. Did discuss she could consider reducing her amitriptyline to 5 mg nightly, and she would like to wait until the summer months and try this, and she will be in contact with me about how she does. We could consider weaning her off completely. Also encouraged her to discuss with PCP l-methylfolate treatment for MTHFR gene. She has no additional questions or concerns. She is going to get her eyes checked today. She verbalizes understanding and agrees with plan moving forward today.    Reviewed medications, potential side effects and signs and symptoms to report. Discussed risk versus benefits of treatment plan with patient and/or family-including medications, labs and radiology that may be ordered. Addressed questions and concerns during visit. Patient and/or family verbalized understanding and agree with plan.    During this visit the following were done:  Labs Reviewed []    Labs Ordered []    Radiology Reports Reviewed []    Radiology Ordered []    PCP Records Reviewed []    Referring Provider Records Reviewed []    ER Records Reviewed []    Hospital Records Reviewed []    History Obtained From  Family []    Radiology Images Reviewed []    Other Reviewed [x]    Records Requested []      Transcribed from ambient dictation for Karrie Breaux Dania, CASSIDY by Kenia Johnson.  02/01/24   12:34 EST    Patient or patient representative verbalized consent to the visit recording.  I have personally performed the services described in this document as transcribed by the above individual, and it is both accurate and complete.  Karrie Juniorer, CASSIDY  2/2/2024  05:16 EST     Note to patient: The 21st Century Cures Act makes medical notes like these available to patients in the interest of transparency. However, be advised this is a medical document. It is intended as peer to peer communication. It is written in medical language and may contain abbreviations or verbiage that are unfamiliar. It may appear blunt or direct. Medical documents are intended to carry relevant information, facts as evident, and the clinical opinion of the provider.

## 2024-02-01 NOTE — PATIENT INSTRUCTIONS
Cut amitriptyline in half to 5mg nightly and see how you do and if less sensitivity to light and sun. Can try in the Summer. Thanks!

## 2024-08-01 ENCOUNTER — OFFICE VISIT (OUTPATIENT)
Dept: NEUROLOGY | Facility: CLINIC | Age: 59
End: 2024-08-01
Payer: MEDICAID

## 2024-08-01 VITALS
WEIGHT: 104 LBS | DIASTOLIC BLOOD PRESSURE: 70 MMHG | HEART RATE: 112 BPM | BODY MASS INDEX: 16.71 KG/M2 | SYSTOLIC BLOOD PRESSURE: 106 MMHG | HEIGHT: 66 IN | OXYGEN SATURATION: 98 %

## 2024-08-01 DIAGNOSIS — F44.5 PSYCHOGENIC NONEPILEPTIC SEIZURE: ICD-10-CM

## 2024-08-01 DIAGNOSIS — G43.109 MIGRAINE WITH AURA AND WITHOUT STATUS MIGRAINOSUS, NOT INTRACTABLE: Primary | ICD-10-CM

## 2024-08-01 DIAGNOSIS — Z72.0 TOBACCO ABUSE: ICD-10-CM

## 2024-08-01 RX ORDER — IBUPROFEN 400 MG/1
600 TABLET ORAL EVERY 6 HOURS PRN
COMMUNITY

## 2024-08-01 RX ORDER — AMITRIPTYLINE HYDROCHLORIDE 10 MG/1
TABLET, FILM COATED ORAL
Qty: 90 TABLET | Refills: 3 | Status: SHIPPED | OUTPATIENT
Start: 2024-08-01

## 2024-08-01 NOTE — LETTER
August 1, 2024     CASSIDY Rothman  [address] [place] [id number]    Patient: Niesha Garcia   YOB: 1965   Date of Visit: 8/1/2024       Dear CASSIDY Rothman    Niesha Garica was in my office today. Below is a copy of my note.    If you have questions, please do not hesitate to call me. I look forward to following Niesha along with you.         Sincerely,        CASSIDY Maier        CC: No Recipients    Subjective:     Patient ID: Niesha Garcia is a 58 y.o. female.    CC:   Chief Complaint   Patient presents with   • Seizures   • Migraine       HPI:   History of Present Illness  This is a 58-year-old female who presents for 6-month neurology follow-up on migraine headaches and nonepileptic seizures present for many years. She has been taking amitriptyline 10 mg nightly for migraine prevention. She has previously been prescribed rizatriptan to take at onset of migraine as needed. Her psychogenic nonepileptic seizures have been present, and she usually has these 1 to 2 times per month, and this has been stable for her. At her last visit to the clinic, she felt that 2 ibuprofen were very effective for her migraines, and she discontinued taking the rizatriptan. She did report some desire to lower her amitriptyline, and I did explain that she could lower this to 5 mg nightly, and we could consider weaning over time. She is here for follow-up and reevaluation of symptoms today.    She was in good health until last night when she experienced a nonepileptic episode, which she attributes to stress.Prior to this, her last episode was approximately 3 months ago. She also experiences dizzy spells and a day of headaches, which do not cause significant distress. Last month, she had 3 to 4 headaches. She continues to take amitriptyline 10 mg nightly and has opted not to lower or wean off of this. She attributes her headaches to weather changes. She has not  "required anything else other than ibuprofen for her migraines, and it is working well for her. She only takes medication for her headaches on the second day of her migraine. She has a high pain tolerance. She continues to smoke and has no plans to quit. She has not recently seen her primary care provider.    Prior Neurological History and Workup:  Migraine headaches and nonepileptic seizures present for many years. She has been taking amitriptyline 10 mg at night for migraine prevention. She has been prescribed rizatriptan at onset of migraine. She was previously seen by the [facility] and diagnosed with nonepileptic seizures. Her episodes have decreased significantly.      She reports her headaches are intermittent, but they are manageable. She has not had the need to take the rizatriptan when she gets a migraine. She has her \"little spouts\" occasionally, but they are not debilitating.      She reports since she is having a reaction to sunlight, she cannot go out in the sun for more than 20 minutes, and she starts getting dizzy spells. She has experienced photosensitivity since she has been taking the amitriptyline for the last 3 years. She used to spend all day out in the sun, and it would not bother her. She has low blood pressure, but she has always had this. She does not see cardiology for her episodes.     She does not have any plans to quit smoking, but she has started smoking less.     She has the MTHFR gene but does not take folate.    She has been following for migraine headaches and nonepileptic seizures for several years.   She has been on low dose amitriptyline for migraine prevention. Prescribed sumatriptan and rizatriptan. Does not feel she needs this.   She did undergo an MRI of the brain without contrast on 01/22/2019, which was read as normal by radiology.   She did undergo an EEG on 08/09/2019, which was normal. Routine was read as normal and previous EEG on 07/16/2015 was also read as " "normal.      Non-epileptic seizures.She states that she has been struggling with this issue since she was 10 years old.       The following portions of the patient's history were reviewed and updated as appropriate: allergies, current medications, past family history, past medical history, past social history, past surgical history, and problem list.    Past Medical History:   Diagnosis Date   • Migraine    • Numbness        History reviewed. No pertinent surgical history.    Social History     Socioeconomic History   • Marital status:    Tobacco Use   • Smoking status: Every Day   • Smokeless tobacco: Never   Vaping Use   • Vaping status: Never Used   Substance and Sexual Activity   • Alcohol use: No   • Drug use: Defer   • Sexual activity: Not Currently       Family History   Problem Relation Age of Onset   • Alcohol abuse Other    • Cancer Other    • Migraines Mother           Current Outpatient Medications:   •  amitriptyline (ELAVIL) 10 MG tablet, Take 1 PO qhs, Disp: 90 tablet, Rfl: 3  •  multivitamin (MULTI-VITAMIN DAILY PO), Take  by mouth Daily., Disp: , Rfl:   •  Vitamin D, Cholecalciferol, (CHOLECALCIFEROL) 10 MCG (400 UNIT) tablet, Take 1 tablet by mouth Daily., Disp: , Rfl:   •  ibuprofen (ADVIL,MOTRIN) 400 MG tablet, Take 1.5 tablets by mouth Every 6 (Six) Hours As Needed for Mild Pain or Moderate Pain. Indications: Migraine Headache, Disp: , Rfl:      Review of Systems   Eyes:  Positive for photophobia.   Neurological:  Positive for seizures (non epileptic) and headaches.   All other systems reviewed and are negative.       Objective:  /70   Pulse 112   Ht 167.6 cm (66\")   Wt 47.2 kg (104 lb)   SpO2 98%   BMI 16.79 kg/m²     Neurologic Exam     Mental Status   Oriented to person, place, and time.   Speech: speech is normal   Level of consciousness: alert    Cranial Nerves     CN II   Visual fields full to confrontation.     CN III, IV, VI   Pupils are equal, round, and reactive to " light.  Extraocular motions are normal.     CN VII   Facial expression full, symmetric.     CN VIII   CN VIII normal.     Gait, Coordination, and Reflexes     Gait  Gait: normal      Physical Exam  Constitutional:       Appearance: Normal appearance.      Comments: BMI 16.8   Eyes:      Extraocular Movements: EOM normal.      Pupils: Pupils are equal, round, and reactive to light.   Neurological:      Mental Status: She is alert and oriented to person, place, and time.      Gait: Gait is intact.   Psychiatric:         Mood and Affect: Mood and affect normal.         Speech: Speech normal.         Results:  Results  None    Assessment/Plan:     Diagnoses and all orders for this visit:    1. Migraine with aura and without status migrainosus, not intractable (Primary)  Comments:  stable  Orders:  -     amitriptyline (ELAVIL) 10 MG tablet; Take 1 PO qhs  Dispense: 90 tablet; Refill: 3    2. Psychogenic nonepileptic seizure  Comments:  stable    3. Tobacco abuse  Comments:  no plans to quit and accepts risk of continuing           Assessment & Plan  Migraine headaches and nonepileptic seizures.  She has been having about 3 to 4 migraine days per month and has opted not to decrease her medication. Her psychogenic nonepileptic seizures have been stable overall. These are always brought on by a stress. She will continue the amitriptyline 10 mg nightly. She will call with any questions or concerns prior to follow up in clinic.     She has no plans to quit smoking at this time and accepts the risk of continuing to smoke. I advised the patient of the risks and benefits of continuing to use tobacco products. I provided this patient with smoking cessation education and discussed how to quit smoking. Patient has not expressed willingness to quit at this time and accepts risks of their decision. 3 minutes.    She verbalized understanding and agrees with plan.    Follow-up  She  will follow up in 1 year or sooner if  needed.    Reviewed medications, potential side effects and signs and symptoms to report. Discussed risk versus benefits of treatment plan with patient and/or family-including medications, labs and radiology that may be ordered. Addressed questions and concerns during visit. Patient and/or family verbalized understanding and agree with plan.    08/01/24   11:27 EDT    Patient or patient representative verbalized consent for the use of Ambient Listening during the visit with  CASSIDY Maier for chart documentation. 8/1/2024  11:40 EDT    Note to patient: The 21st Century Cures Act makes medical notes like these available to patients in the interest of transparency. However, be advised this is a medical document. It is intended as peer to peer communication. It is written in medical language and may contain abbreviations or verbiage that are unfamiliar. It may appear blunt or direct. Medical documents are intended to carry relevant information, facts as evident, and the clinical opinion of the provider.

## 2024-08-01 NOTE — PROGRESS NOTES
Subjective:     Patient ID: Niesha  Jose is a 58 y.o. female.    CC:   Chief Complaint   Patient presents with    Seizures    Migraine       HPI:   History of Present Illness  This is a 58-year-old female who presents for 6-month neurology follow-up on migraine headaches and nonepileptic seizures present for many years. She has been taking amitriptyline 10 mg nightly for migraine prevention. She has previously been prescribed rizatriptan to take at onset of migraine as needed. Her psychogenic nonepileptic seizures have been present, and she usually has these 1 to 2 times per month, and this has been stable for her. At her last visit to the clinic, she felt that 2 ibuprofen were very effective for her migraines, and she discontinued taking the rizatriptan. She did report some desire to lower her amitriptyline, and I did explain that she could lower this to 5 mg nightly, and we could consider weaning over time. She is here for follow-up and reevaluation of symptoms today.    She was in good health until last night when she experienced a nonepileptic episode, which she attributes to stress.Prior to this, her last episode was approximately 3 months ago. She also experiences dizzy spells and a day of headaches, which do not cause significant distress. Last month, she had 3 to 4 headaches. She continues to take amitriptyline 10 mg nightly and has opted not to lower or wean off of this. She attributes her headaches to weather changes. She has not required anything else other than ibuprofen for her migraines, and it is working well for her. She only takes medication for her headaches on the second day of her migraine. She has a high pain tolerance. She continues to smoke and has no plans to quit. She has not recently seen her primary care provider.    Prior Neurological History and Workup:  Migraine headaches and nonepileptic seizures present for many years. She has been taking amitriptyline 10 mg at night for  "migraine prevention. She has been prescribed rizatriptan at onset of migraine. She was previously seen by the [facility] and diagnosed with nonepileptic seizures. Her episodes have decreased significantly.      She reports her headaches are intermittent, but they are manageable. She has not had the need to take the rizatriptan when she gets a migraine. She has her \"little spouts\" occasionally, but they are not debilitating.      She reports since she is having a reaction to sunlight, she cannot go out in the sun for more than 20 minutes, and she starts getting dizzy spells. She has experienced photosensitivity since she has been taking the amitriptyline for the last 3 years. She used to spend all day out in the sun, and it would not bother her. She has low blood pressure, but she has always had this. She does not see cardiology for her episodes.     She does not have any plans to quit smoking, but she has started smoking less.     She has the MTHFR gene but does not take folate.    She has been following for migraine headaches and nonepileptic seizures for several years.   She has been on low dose amitriptyline for migraine prevention. Prescribed sumatriptan and rizatriptan. Does not feel she needs this.   She did undergo an MRI of the brain without contrast on 01/22/2019, which was read as normal by radiology.   She did undergo an EEG on 08/09/2019, which was normal. Routine was read as normal and previous EEG on 07/16/2015 was also read as normal.      Non-epileptic seizures.She states that she has been struggling with this issue since she was 10 years old.       The following portions of the patient's history were reviewed and updated as appropriate: allergies, current medications, past family history, past medical history, past social history, past surgical history, and problem list.    Past Medical History:   Diagnosis Date    Migraine     Numbness        History reviewed. No pertinent surgical " "history.    Social History     Socioeconomic History    Marital status:    Tobacco Use    Smoking status: Every Day    Smokeless tobacco: Never   Vaping Use    Vaping status: Never Used   Substance and Sexual Activity    Alcohol use: No    Drug use: Defer    Sexual activity: Not Currently       Family History   Problem Relation Age of Onset    Alcohol abuse Other     Cancer Other     Migraines Mother           Current Outpatient Medications:     amitriptyline (ELAVIL) 10 MG tablet, Take 1 PO qhs, Disp: 90 tablet, Rfl: 3    multivitamin (MULTI-VITAMIN DAILY PO), Take  by mouth Daily., Disp: , Rfl:     Vitamin D, Cholecalciferol, (CHOLECALCIFEROL) 10 MCG (400 UNIT) tablet, Take 1 tablet by mouth Daily., Disp: , Rfl:     ibuprofen (ADVIL,MOTRIN) 400 MG tablet, Take 1.5 tablets by mouth Every 6 (Six) Hours As Needed for Mild Pain or Moderate Pain. Indications: Migraine Headache, Disp: , Rfl:      Review of Systems   Eyes:  Positive for photophobia.   Neurological:  Positive for seizures (non epileptic) and headaches.   All other systems reviewed and are negative.       Objective:  /70   Pulse 112   Ht 167.6 cm (66\")   Wt 47.2 kg (104 lb)   SpO2 98%   BMI 16.79 kg/m²     Neurologic Exam     Mental Status   Oriented to person, place, and time.   Speech: speech is normal   Level of consciousness: alert    Cranial Nerves     CN II   Visual fields full to confrontation.     CN III, IV, VI   Pupils are equal, round, and reactive to light.  Extraocular motions are normal.     CN VII   Facial expression full, symmetric.     CN VIII   CN VIII normal.     Gait, Coordination, and Reflexes     Gait  Gait: normal      Physical Exam  Constitutional:       Appearance: Normal appearance.      Comments: BMI 16.8   Eyes:      Extraocular Movements: EOM normal.      Pupils: Pupils are equal, round, and reactive to light.   Neurological:      Mental Status: She is alert and oriented to person, place, and time.      Gait: " Gait is intact.   Psychiatric:         Mood and Affect: Mood and affect normal.         Speech: Speech normal.         Results:  Results  None    Assessment/Plan:     Diagnoses and all orders for this visit:    1. Migraine with aura and without status migrainosus, not intractable (Primary)  Comments:  stable  Orders:  -     amitriptyline (ELAVIL) 10 MG tablet; Take 1 PO qhs  Dispense: 90 tablet; Refill: 3    2. Psychogenic nonepileptic seizure  Comments:  stable    3. Tobacco abuse  Comments:  no plans to quit and accepts risk of continuing           Assessment & Plan  Migraine headaches and nonepileptic seizures.  She has been having about 3 to 4 migraine days per month and has opted not to decrease her medication. Her psychogenic nonepileptic seizures have been stable overall. These are always brought on by a stress. She will continue the amitriptyline 10 mg nightly. She will call with any questions or concerns prior to follow up in clinic.     She has no plans to quit smoking at this time and accepts the risk of continuing to smoke. I advised the patient of the risks and benefits of continuing to use tobacco products. I provided this patient with smoking cessation education and discussed how to quit smoking. Patient has not expressed willingness to quit at this time and accepts risks of their decision. 3 minutes.    She verbalized understanding and agrees with plan.    Follow-up  She  will follow up in 1 year or sooner if needed.    Reviewed medications, potential side effects and signs and symptoms to report. Discussed risk versus benefits of treatment plan with patient and/or family-including medications, labs and radiology that may be ordered. Addressed questions and concerns during visit. Patient and/or family verbalized understanding and agree with plan.    08/01/24   11:27 EDT    Patient or patient representative verbalized consent for the use of Ambient Listening during the visit with  Karrie Najera  APRN for chart documentation. 8/1/2024  11:40 EDT    Note to patient: The 21st Century Cures Act makes medical notes like these available to patients in the interest of transparency. However, be advised this is a medical document. It is intended as peer to peer communication. It is written in medical language and may contain abbreviations or verbiage that are unfamiliar. It may appear blunt or direct. Medical documents are intended to carry relevant information, facts as evident, and the clinical opinion of the provider.

## 2025-07-28 ENCOUNTER — OFFICE VISIT (OUTPATIENT)
Dept: NEUROLOGY | Facility: CLINIC | Age: 60
End: 2025-07-28
Payer: MEDICAID

## 2025-07-28 ENCOUNTER — LAB (OUTPATIENT)
Age: 60
End: 2025-07-28
Payer: MEDICAID

## 2025-07-28 VITALS
OXYGEN SATURATION: 95 % | BODY MASS INDEX: 17.12 KG/M2 | HEIGHT: 66 IN | DIASTOLIC BLOOD PRESSURE: 70 MMHG | WEIGHT: 106.5 LBS | HEART RATE: 110 BPM | SYSTOLIC BLOOD PRESSURE: 110 MMHG

## 2025-07-28 DIAGNOSIS — R55 VASOVAGAL NEAR SYNCOPE: ICD-10-CM

## 2025-07-28 DIAGNOSIS — G43.109 MIGRAINE WITH AURA AND WITHOUT STATUS MIGRAINOSUS, NOT INTRACTABLE: ICD-10-CM

## 2025-07-28 DIAGNOSIS — F44.5 PSYCHOGENIC NONEPILEPTIC SEIZURE: Primary | ICD-10-CM

## 2025-07-28 DIAGNOSIS — R41.0 EPISODE OF CONFUSION: ICD-10-CM

## 2025-07-28 DIAGNOSIS — R42 ORTHOSTATIC DIZZINESS: ICD-10-CM

## 2025-07-28 PROBLEM — E46 UNSPECIFIED PROTEIN-CALORIE MALNUTRITION: Status: ACTIVE | Noted: 2025-07-28

## 2025-07-28 PROBLEM — M75.40 ROTATOR CUFF IMPINGEMENT SYNDROME: Status: ACTIVE | Noted: 2025-07-28

## 2025-07-28 LAB
DEPRECATED RDW RBC AUTO: 43.5 FL (ref 37–54)
ERYTHROCYTE [DISTWIDTH] IN BLOOD BY AUTOMATED COUNT: 12.5 % (ref 12.3–15.4)
HCT VFR BLD AUTO: 46.2 % (ref 34–46.6)
HGB BLD-MCNC: 15.6 G/DL (ref 12–15.9)
MCH RBC QN AUTO: 32.2 PG (ref 26.6–33)
MCHC RBC AUTO-ENTMCNC: 33.8 G/DL (ref 31.5–35.7)
MCV RBC AUTO: 95.3 FL (ref 79–97)
PLATELET # BLD AUTO: 341 10*3/MM3 (ref 140–450)
PMV BLD AUTO: 9.3 FL (ref 6–12)
RBC # BLD AUTO: 4.85 10*6/MM3 (ref 3.77–5.28)
WBC NRBC COR # BLD AUTO: 6.47 10*3/MM3 (ref 3.4–10.8)

## 2025-07-28 PROCEDURE — 84439 ASSAY OF FREE THYROXINE: CPT | Performed by: NURSE PRACTITIONER

## 2025-07-28 PROCEDURE — 82607 VITAMIN B-12: CPT | Performed by: NURSE PRACTITIONER

## 2025-07-28 PROCEDURE — 84443 ASSAY THYROID STIM HORMONE: CPT | Performed by: NURSE PRACTITIONER

## 2025-07-28 PROCEDURE — 1160F RVW MEDS BY RX/DR IN RCRD: CPT | Performed by: NURSE PRACTITIONER

## 2025-07-28 PROCEDURE — 80053 COMPREHEN METABOLIC PANEL: CPT | Performed by: NURSE PRACTITIONER

## 2025-07-28 PROCEDURE — 99214 OFFICE O/P EST MOD 30 MIN: CPT | Performed by: NURSE PRACTITIONER

## 2025-07-28 PROCEDURE — 82746 ASSAY OF FOLIC ACID SERUM: CPT | Performed by: NURSE PRACTITIONER

## 2025-07-28 PROCEDURE — 83921 ORGANIC ACID SINGLE QUANT: CPT | Performed by: NURSE PRACTITIONER

## 2025-07-28 PROCEDURE — 84100 ASSAY OF PHOSPHORUS: CPT | Performed by: NURSE PRACTITIONER

## 2025-07-28 PROCEDURE — 82306 VITAMIN D 25 HYDROXY: CPT | Performed by: NURSE PRACTITIONER

## 2025-07-28 PROCEDURE — 1159F MED LIST DOCD IN RCRD: CPT | Performed by: NURSE PRACTITIONER

## 2025-07-28 PROCEDURE — 85027 COMPLETE CBC AUTOMATED: CPT | Performed by: NURSE PRACTITIONER

## 2025-07-28 PROCEDURE — 83735 ASSAY OF MAGNESIUM: CPT | Performed by: NURSE PRACTITIONER

## 2025-07-28 RX ORDER — AMITRIPTYLINE HYDROCHLORIDE 10 MG/1
TABLET ORAL
Qty: 90 TABLET | Refills: 3 | Status: SHIPPED | OUTPATIENT
Start: 2025-07-28
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
## 2025-07-29 LAB
25(OH)D3 SERPL-MCNC: 54.4 NG/ML (ref 30–100)
ALBUMIN SERPL-MCNC: 4.7 G/DL (ref 3.5–5.2)
ALBUMIN/GLOB SERPL: 1.7 G/DL
ALP SERPL-CCNC: 78 U/L (ref 39–117)
ALT SERPL W P-5'-P-CCNC: 11 U/L (ref 1–33)
ANION GAP SERPL CALCULATED.3IONS-SCNC: 10.2 MMOL/L (ref 5–15)
AST SERPL-CCNC: 21 U/L (ref 1–32)
BILIRUB SERPL-MCNC: 0.6 MG/DL (ref 0–1.2)
BUN SERPL-MCNC: 14 MG/DL (ref 6–20)
BUN/CREAT SERPL: 17.1 (ref 7–25)
CALCIUM SPEC-SCNC: 9.9 MG/DL (ref 8.6–10.5)
CHLORIDE SERPL-SCNC: 104 MMOL/L (ref 98–107)
CO2 SERPL-SCNC: 25.8 MMOL/L (ref 22–29)
CREAT SERPL-MCNC: 0.82 MG/DL (ref 0.57–1)
EGFRCR SERPLBLD CKD-EPI 2021: 83.5 ML/MIN/1.73
FOLATE SERPL-MCNC: >20 NG/ML (ref 4.78–24.2)
GLOBULIN UR ELPH-MCNC: 2.7 GM/DL
GLUCOSE SERPL-MCNC: 88 MG/DL (ref 65–99)
MAGNESIUM SERPL-MCNC: 2.4 MG/DL (ref 1.6–2.6)
PHOSPHATE SERPL-MCNC: 3.8 MG/DL (ref 2.5–4.5)
POTASSIUM SERPL-SCNC: 4.9 MMOL/L (ref 3.5–5.2)
PROT SERPL-MCNC: 7.4 G/DL (ref 6–8.5)
SODIUM SERPL-SCNC: 140 MMOL/L (ref 136–145)
T4 FREE SERPL-MCNC: 1.24 NG/DL (ref 0.92–1.68)
TSH SERPL DL<=0.05 MIU/L-ACNC: 1.49 UIU/ML (ref 0.27–4.2)
VIT B12 BLD-MCNC: 802 PG/ML (ref 211–946)

## 2025-08-01 ENCOUNTER — RESULTS FOLLOW-UP (OUTPATIENT)
Dept: NEUROLOGY | Facility: CLINIC | Age: 60
End: 2025-08-01
Payer: MEDICAID

## 2025-08-01 NOTE — PROGRESS NOTES
Please notify Niesha that her labs are all completely normal. Can fax to PCP. We will notify her of additional test results once received. Thanks, CASSIDY Figueroa

## 2025-08-02 LAB — METHYLMALONATE SERPL-SCNC: 148 NMOL/L (ref 0–378)
